# Patient Record
Sex: MALE | Race: WHITE | NOT HISPANIC OR LATINO | Employment: FULL TIME | URBAN - METROPOLITAN AREA
[De-identification: names, ages, dates, MRNs, and addresses within clinical notes are randomized per-mention and may not be internally consistent; named-entity substitution may affect disease eponyms.]

---

## 2023-07-10 ENCOUNTER — HOSPITAL ENCOUNTER (OUTPATIENT)
Facility: HOSPITAL | Age: 53
Setting detail: HOSPITAL OUTPATIENT SURGERY
End: 2023-07-10
Attending: ORTHOPAEDIC SURGERY | Admitting: ORTHOPAEDIC SURGERY
Payer: COMMERCIAL

## 2023-07-10 PROBLEM — S83.242A ACUTE MENISCAL TEAR, MEDIAL, LEFT, INITIAL ENCOUNTER: Status: ACTIVE | Noted: 2023-07-10

## 2023-07-25 ENCOUNTER — PRE-ADMISSION TESTING (OUTPATIENT)
Dept: PREADMISSION TESTING | Facility: HOSPITAL | Age: 53
End: 2023-07-25
Payer: COMMERCIAL

## 2023-07-25 VITALS
TEMPERATURE: 98.1 F | BODY MASS INDEX: 46.19 KG/M2 | SYSTOLIC BLOOD PRESSURE: 189 MMHG | DIASTOLIC BLOOD PRESSURE: 97 MMHG | HEIGHT: 67 IN | WEIGHT: 294.3 LBS | RESPIRATION RATE: 20 BRPM | HEART RATE: 83 BPM | OXYGEN SATURATION: 98 %

## 2023-07-25 LAB
ANION GAP SERPL CALCULATED.3IONS-SCNC: 10.1 MMOL/L (ref 5–15)
BUN SERPL-MCNC: 13 MG/DL (ref 6–20)
BUN/CREAT SERPL: 14.6 (ref 7–25)
CALCIUM SPEC-SCNC: 9.5 MG/DL (ref 8.6–10.5)
CHLORIDE SERPL-SCNC: 102 MMOL/L (ref 98–107)
CO2 SERPL-SCNC: 24.9 MMOL/L (ref 22–29)
CREAT SERPL-MCNC: 0.89 MG/DL (ref 0.76–1.27)
DEPRECATED RDW RBC AUTO: 42.9 FL (ref 37–54)
EGFRCR SERPLBLD CKD-EPI 2021: 102.5 ML/MIN/1.73
ERYTHROCYTE [DISTWIDTH] IN BLOOD BY AUTOMATED COUNT: 13.4 % (ref 12.3–15.4)
GLUCOSE SERPL-MCNC: 93 MG/DL (ref 65–99)
HCT VFR BLD AUTO: 45.2 % (ref 37.5–51)
HGB BLD-MCNC: 14.8 G/DL (ref 13–17.7)
MCH RBC QN AUTO: 28.8 PG (ref 26.6–33)
MCHC RBC AUTO-ENTMCNC: 32.7 G/DL (ref 31.5–35.7)
MCV RBC AUTO: 87.9 FL (ref 79–97)
PLATELET # BLD AUTO: 295 10*3/MM3 (ref 140–450)
PMV BLD AUTO: 9.5 FL (ref 6–12)
POTASSIUM SERPL-SCNC: 4.4 MMOL/L (ref 3.5–5.2)
QT INTERVAL: 347 MS
RBC # BLD AUTO: 5.14 10*6/MM3 (ref 4.14–5.8)
SODIUM SERPL-SCNC: 137 MMOL/L (ref 136–145)
WBC NRBC COR # BLD: 6.65 10*3/MM3 (ref 3.4–10.8)

## 2023-07-25 PROCEDURE — 93005 ELECTROCARDIOGRAM TRACING: CPT

## 2023-07-25 PROCEDURE — 80048 BASIC METABOLIC PNL TOTAL CA: CPT

## 2023-07-25 PROCEDURE — 85027 COMPLETE CBC AUTOMATED: CPT

## 2023-07-25 PROCEDURE — 93010 ELECTROCARDIOGRAM REPORT: CPT | Performed by: INTERNAL MEDICINE

## 2023-07-25 PROCEDURE — 36415 COLL VENOUS BLD VENIPUNCTURE: CPT

## 2023-07-25 RX ORDER — TADALAFIL 20 MG/1
20 TABLET ORAL DAILY PRN
COMMUNITY

## 2023-07-25 RX ORDER — IBUPROFEN 200 MG
400 TABLET ORAL DAILY
COMMUNITY

## 2023-07-25 RX ORDER — DEXTROAMPHETAMINE SACCHARATE, AMPHETAMINE ASPARTATE, DEXTROAMPHETAMINE SULFATE AND AMPHETAMINE SULFATE 5; 5; 5; 5 MG/1; MG/1; MG/1; MG/1
20 TABLET ORAL 2 TIMES DAILY
COMMUNITY

## 2023-07-25 RX ORDER — CYCLOBENZAPRINE HCL 5 MG
5 TABLET ORAL 2 TIMES DAILY PRN
COMMUNITY

## 2023-07-25 NOTE — DISCHARGE INSTRUCTIONS
Take the following medications the morning of surgery:    NONE    ARRIVE TO OUTPATIENT SURGERY CENTER 8-10-23:  HOSPITAL WILL CALL YOU WITH ARRIVAL TIME      If you are on prescription narcotic pain medication to control your pain you may also take that medication the morning of surgery.    General Instructions:  Do not eat solid food after midnight the night before surgery.  You may drink clear liquids day of surgery but must stop at least one hour before your hospital arrival time.  It is beneficial for you to have a clear drink that contains carbohydrates the day of surgery.  We suggest a 12 to 20 ounce bottle of Gatorade or Powerade for non-diabetic patients or a 12 to 20 ounce bottle of G2 or Powerade Zero for diabetic patients. (Pediatric patients, are not advised to drink a 12 to 20 ounce carbohydrate drink)    Clear liquids are liquids you can see through.  Nothing red in color.     Plain water                               Sports drinks  Sodas                                   Gelatin (Jell-O)  Fruit juices without pulp such as white grape juice and apple juice  Popsicles that contain no fruit or yogurt  Tea or coffee (no cream or milk added)  Gatorade / Powerade  G2 / Powerade Zero    Infants may have breast milk up to four hours before surgery.  Infants drinking formula may drink formula up to six hours before surgery.   Patients who avoid smoking, chewing tobacco and alcohol for 4 weeks prior to surgery have a reduced risk of post-operative complications.  Quit smoking as many days before surgery as you can.  Do not smoke, use chewing tobacco or drink alcohol the day of surgery.   If applicable bring your C-PAP/ BI-PAP machine in with you to preop day of surgery.  Bring any papers given to you in the doctor’s office.  Wear clean comfortable clothes.  Do not wear contact lenses, false eyelashes or make-up.  Bring a case for your glasses.   Bring crutches or walker if applicable.  Remove all piercings.   Leave jewelry and any other valuables at home.  Hair extensions with metal clips must be removed prior to surgery.  The Pre-Admission Testing nurse will instruct you to bring medications if unable to obtain an accurate list in Pre-Admission Testing.        Preventing a Surgical Site Infection:  For 2 to 3 days before surgery, avoid shaving with a razor because the razor can irritate skin and make it easier to develop an infection.    Any areas of open skin can increase the risk of a post-operative wound infection by allowing bacteria to enter and travel throughout the body.  Notify your surgeon if you have any skin wounds / rashes even if it is not near the expected surgical site.  The area will need assessed to determine if surgery should be delayed until it is healed.  The night prior to surgery shower using a fresh bar of anti-bacterial soap (such as Dial) and clean washcloth.  Sleep in a clean bed with clean clothing.  Do not allow pets to sleep with you.  Shower on the morning of surgery using a fresh bar of anti-bacterial soap (such as Dial) and clean washcloth.  Dry with a clean towel and dress in clean clothing.  Ask your surgeon if you will be receiving antibiotics prior to surgery.  Make sure you, your family, and all healthcare providers clean their hands with soap and water or an alcohol based hand  before caring for you or your wound.    Day of surgery:  Your arrival time is approximately two hours before your scheduled surgery time.  Upon arrival, a Pre-op nurse and Anesthesiologist will review your health history, obtain vital signs, and answer questions you may have.  The only belongings needed at this time will be a list of your home medications and if applicable your C-PAP/BI-PAP machine.  A Pre-op nurse will start an IV and you may receive medication in preparation for surgery, including something to help you relax.     Please be aware that surgery does come with discomfort.  We want to  make every effort to control your discomfort so please discuss any uncontrolled symptoms with your nurse.   Your doctor will most likely have prescribed pain medications.      If you are going home after surgery you will receive individualized written care instructions before being discharged.  A responsible adult must drive you to and from the hospital on the day of your surgery and stay with you for 24 hours.  Discharge prescriptions can be filled by the hospital pharmacy during regular pharmacy hours.  If you are having surgery late in the day/evening your prescription may be e-prescribed to your pharmacy.  Please verify your pharmacy hours or chose a 24 hour pharmacy to avoid not having access to your prescription because your pharmacy has closed for the day.    If you are staying overnight following surgery, you will be transported to your hospital room following the recovery period.  [facility] has all private rooms.    If you have any questions please call Pre-Admission Testing at [phone].  Deductibles and co-payments are collected on the day of service. Please be prepared to pay the required co-pay, deductible or deposit on the day of service as defined by your plan.    Call your surgeon immediately if you experience any of the following symptoms:  Sore Throat  Shortness of Breath or difficulty breathing  Cough  Chills  Body soreness or muscle pain  Headache  Fever  New loss of taste or smell  Do not arrive for your surgery ill.  Your procedure will need to be rescheduled to another time.  You will need to call your physician before the day of surgery to avoid any unnecessary exposure to hospital staff as well as other patients.

## 2023-08-07 DIAGNOSIS — S83.242A ACUTE MENISCAL TEAR, MEDIAL, LEFT, INITIAL ENCOUNTER: Primary | ICD-10-CM

## 2023-08-10 ENCOUNTER — HOSPITAL ENCOUNTER (OUTPATIENT)
Dept: PHYSICAL THERAPY | Facility: HOSPITAL | Age: 53
Setting detail: HOSPITAL OUTPATIENT SURGERY
Discharge: HOME | End: 2023-08-10
Payer: COMMERCIAL

## 2023-08-10 DIAGNOSIS — S83.242D ACUTE MEDIAL MENISCUS TEAR OF LEFT KNEE, SUBSEQUENT ENCOUNTER: ICD-10-CM

## 2023-08-10 DIAGNOSIS — M25.562 LEFT MEDIAL KNEE PAIN: Primary | ICD-10-CM

## 2023-08-10 PROCEDURE — 97110 THERAPEUTIC EXERCISES: CPT

## 2023-08-10 PROCEDURE — 97161 PT EVAL LOW COMPLEX 20 MIN: CPT

## 2023-08-10 NOTE — THERAPY EVALUATION
"  Outpatient Physical Therapy Ortho Initial Evaluation  [facility]     Patient Name: Abhijeet Bolton  : 1970  MRN: [id number]  Today's Date: 8/10/2023      Visit Date: 08/10/2023    Patient Active Problem List   Diagnosis    Acute meniscal tear, medial, left, initial encounter        Past Medical History:   Diagnosis Date    Sleep apnea     DOES NOT WEAR CPAP    Tear meniscus knee     LEFT KNEE:  PAIN, LIMITED MOBILITY        Past Surgical History:   Procedure Laterality Date    COLONOSCOPY         Visit Dx:     ICD-10-CM ICD-9-CM   1. Left medial knee pain  M25.562 719.46   2. Acute medial meniscus tear of left knee, subsequent encounter  S83.242D V58.89     836.0          Patient History       Row Name 08/10/23 0700             History    Chief Complaint Pain  -LOUISE      Type of Pain Knee pain  -LOUISE      Date Current Problem(s) Began --  1 year ago  -LOUISE      Brief Description of Current Complaint Abhijeet Bolton is a 53 y.o. male who presents to physical therapy today with primary compliant of L knee pain that began about 1 year. Patient without true ANDREW. He does recall an incident where he stood from a seated position and immediately felt \"something funny\" in his knee.  He has continued to have problems ever since.  Current pain is described as moderate, constant and aching. He reports associated clicking and popping. Previous L knee injection that helped for approximately one month but has since returned to baseline. He is scheduled for additional L knee injection directly after today's session. X-ray/MRI revealed moderate OA and radial tear of the posterior horn medial meniscus. He was originally scheduled for left knee arthroscopy and partial meniscectomy including possible chondroplasty for today but was informed insurance denial yesterday and needs formal PT prior to surgical intervention. Abhijeet Bolton reports difficulty/increased pain with standing from a chair after sitting " for a period of time, lateral movements, walking longer distances, sleep disturbances, and navigating stairs. Pain relieving factors include stretch, heat, and NSAIDS. PMH includes inguinal hernia.  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 X-ray;MRI  -LOUISE      Results of Clinical Tests see epic  -LOUISE         Pain     Pain Location Knee  -LOUISE      Pain at Present 1  -LOUISE      Pain at Best 1  -LOUISE      Pain at Worst 6  -LOUISE      Pain Description Aching;Dull;Sharp  -LOUISE      Is your sleep disturbed? Yes  -LOUISE         Fall Risk Assessment    Any falls in the past year: No  -LOUISE         Services    Prior Rehab/Home Health Experiences No  -LOUISE      Are you currently receiving Home Health services No  -LOUISE      Do you plan to receive Home Health services in the near future No  -LOUISE         Daily Activities    Primary Language English  -LOUISE      Pt Participated in POC and Goals Yes  -LOUISE         Safety    Are you being hurt, hit, or frightened by anyone at home or in your life? No  -LOUISE      Are you being neglected by a caregiver No  -LOUISE      Have you had any of the following issues with N/A  -LOUISE                User Key  (r) = Recorded By, (t) = Taken By, (c) = Cosigned By      Initials Name Provider Type    Liliana Marr, PT Physical Therapist                     PT Ortho       Row Name 08/10/23 0700       Special Tests/Palpation    Special Tests/Palpation Knee  -LOUISE       Knee Palpation    Knee Palpation? Yes  -LOUISE    Medial Joint Line Left:;Tender  -LOUISE       Patellar Accessory Motions    Patellar Accessory Motions Tested? Yes  -LOUISE    Superior glide Left:;WNL  -LOUISE    Inferior glide Left:;WNL  -LOUISE    Medial glide Left:;Hypomobile;Left pain   -LOUISE    Lateral glide Left:;Hypomobile;Left pain  -LOUISE       Knee Special Tests    Anterior drawer (ACL lesion) Negative  -LOUISE    Posterior drawer (PCL lesion) Negative  -LOUISE    Valgus stress (MCL lesion) Negative  reports medial knee pain without laxity  -LOUISE    Varus stress (LCL lesion) Negative  -LOUISE    Thessaly test (meniscal lesion) Left:;Positive  -LOUISE    Ashley's test (meniscal lesion) Left:;Positive  -LOUISE    Bounce home test (meniscal lesion) Left:;Positive  -LOUISE       General ROM    RT Lower Ext Rt Knee Extension/Flexion  -LOUISE    LT Lower Ext Lt Knee Extension/Flexion  -LOUISE       Right Lower Ext    Rt Knee Extension/Flexion AROM lacking 1-115  -LOUISE       Left Lower Ext    Lt Knee Extension/Flexion AROM lacking 4-105  -LOUISE       MMT (Manual Muscle Testing)    Rt Lower Ext Rt Hip Flexion;Rt Hip Extension;Rt Hip ABduction;Rt Knee Extension;Rt Knee Flexion;Rt Ankle Dorsiflexion  -LOUISE    Lt Lower Ext Lt Hip Flexion;Lt Hip Extension;Lt Hip ABduction;Lt Knee Extension;Lt Knee Flexion;Lt Ankle Dorsiflexion  -LOUISE       MMT Right Lower Ext    Rt Hip Flexion MMT, Gross Movement (5/5) normal  -LOUISE    Rt Hip Extension MMT, Gross Movement (4+/5) good plus  -LOUISE    Rt Hip ABduction MMT, Gross Movement (4+/5) good plus  -LOUISE    Rt Knee Extension MMT, Gross Movement (5/5) normal  -LOUISE    Rt Knee Flexion MMT, Gross Movement (5/5) normal  -LOUISE    Rt Ankle Dorsiflexion MMT, Gross Movement (5/5) normal  -LOUISE       MMT Left Lower Ext    Lt Hip Flexion MMT, Gross Movement (4+/5) good plus  -LOUISE    Lt Hip Extension MMT, Gross Movement (4+/5) good plus  -LOUISE    Lt Hip ABduction MMT, Gross Movement (4/5) good  -LOUISE    Lt Knee Extension MMT, Gross Movement (4+/5) good plus  -LOUISE    Lt Knee Flexion MMT, Gross Movement (4+/5) good plus  -LOUISE    Lt Ankle Dorsiflexion MMT, Gross Movement (5/5) normal  -LOUISE    Lt Lower Extremity Comments  hip abd/ext proximal to the knee  -LOUISE       Flexibility    Flexibility Tested? Lower Extremity  -LOUISE       Lower Extremity  Flexibility    Hamstrings Bilateral:;Moderately limited  -LOUISE    Gastrocnemius Bilateral:;Moderately limited  -LOUISE       Balance Skills Training    SLS < 5 seconds on L  -LOUISE       Gait/Stairs (Locomotion)    Comment, (Gait/Stairs) antalgic gait, limited L sided heel strike, dec L knee mobility during swing  -LOUISE              User Key  (r) = Recorded By, (t) = Taken By, (c) = Cosigned By      Initials Name Provider Type    Liliana Marr, PT Physical Therapist                                Therapy Education  Education Details: Educated on anatomy/pathology, evaluation findings, therapy expectations, POC and HEP, Access Code [email or web address]  Given: HEP, Symptoms/condition management, Pain management, Posture/body mechanics  Program: New  How Provided: Verbal, Demonstration, Written  Provided to: Patient  Level of Understanding: Verbalized, Demonstrated  37804 - PT Self Care/Mgmt Minutes: 5      PT OP Goals       Row Name 08/10/23 0700          PT Short Term Goals    STG Date to Achieve 09/09/23  -LOUISE     STG 1 Pt will be independent with initial HEP to improve strength/ROM and decrease pain.  -LOUISE     STG 1 Progress New  -     STG 2 Pt will report subjective pain of 3/10 while participating in walking program (1-3 miles - 3-5x/week) improve walking tolerance in attempt to return to PLOF.  -     STG 2 Progress New  -LOUISE (r) BS (t) LOUISE (c)        Long Term Goals    LTG Date to Achieve 10/09/23  -LOUISE     LTG 1 Pt will be independent with advance HEP to improve strength/ROM and decrease pain.  -LOUISE     LTG 1 Progress New  -LOUISE     LTG 2 Patient will verbalized understanding regarding post operative expectations regarding healing time frames and functional ability following expected medial meniscus repair surgery.  -LOUISE     LTG 2 Progress New  -LOUISE     LTG 3 Pt will improve L knee AROM from lacking 4-105 degrees to lacking 2-110 degrees to improve ability to navigate stairs.  -LOUISE     LTG 3 Progress New  -LOUISE     LTG 4 Pt will  improve L SLS to >/= 20 seconds to improve activity tolerance when ambulating over uneven terrain and when hiking.  -LOUISE     LTG 4 Progress New  -LOUISE (r) BS (t) LOUISE (c)     LTG 5 Pt will improve KOS score to 65% to show improved quality of life.  -LOUISE     LTG 5 Progress New  -LOUISE        Time Calculation    PT Goal Re-Cert Due Date 11/08/23  -LOUISE               User Key  (r) = Recorded By, (t) = Taken By, (c) = Cosigned By      Initials Name Provider Type    Liliana Marr, PT Physical Therapist    BS Rojas Mcfadden, PT Student PT Student                     PT Assessment/Plan       Row Name 08/10/23 0700          PT Assessment    Functional Limitations Impaired gait;Limitation in home management;Limitations in community activities;Limitations in functional capacity and performance;Performance in leisure activities;Performance in work activities  -     Impairments Balance;Gait;Impaired flexibility;Joint mobility;Joint integrity;Muscle strength;Pain;Poor body mechanics;Range of motion  -     Assessment Comments Abhijeet Bolton is a 53 y.o. male referred to physical therapy for L knee pain. X-ray/MRI revealed moderate OA and radial tear of the posterior horn medial meniscus. He was originally scheduled for left knee arthroscopy and partial meniscectomy including possible chondroplasty for today but was informed insurance denial yesterday and needs formal PT prior to surgical intervention. He presents with a stable clinical presentation, along with co-morbidities of inguinal hernia that may impact his progress in the plan of care. Pt presents today with impaired gait mechanics, dec SLS time, mild strength deficits, dec L knee AROM, TTP over medial aspect of L knee and impaired hamstring/gastroc flexibility. Special test + for medial meniscal involvement. His signs and symptoms are consistent with referring diagnosis. The previous impairments limit his ability to rise from a chair without pain, navigate  stairs without discomfort, sleep without disturbance and participate in walking program at [facility]. Patients KOS outcome measure score indicates 50% ability. Pt will benefit from skilled PT to address the previous impairments and return to [facility].  -LOUISE     Please refer to paper survey for additional self-reported information Yes  -LOUISE     Rehab Potential Good  -LOUISE     Patient/caregiver participated in establishment of treatment plan and goals Yes  -LOUISE     Patient would benefit from skilled therapy intervention Yes  -LOUISE        PT Plan    PT Frequency 2x/week;1x/week  -LOUISE     Predicted Duration of Therapy Intervention (PT) 8-10 visits  -LOUISE     Planned CPT's? PT EVAL LOW COMPLEXITY: 95320;PT RE-EVAL: 19014;PT THER PROC EA 15 MIN: 19886;PT THER ACT EA 15 MIN: 10023;PT MANUAL THERAPY EA 15 MIN: 95713;PT NEUROMUSC RE-EDUCATION EA 15 MIN: 09719;PT GAIT TRAINING EA 15 MIN: 59158;PT SELF CARE/HOME MGMT/TRAIN EA 15: 38848;PT HOT OR COLD PACK TREAT MCARE;PT ELECTRICAL STIM UNATTEND:   -LOUISE     PT Plan Comments response to evalution, stretch at stairs, consider SL clam shells, STS, sagittal lunge onto foam, mini squats onto foam, HR, HS curls, small step up, lateral stepping (patient plans to go hiking in 3 weeks)- pt plans to leave for [place] trip in [place]  -JP               User Key  (r) = Recorded By, (t) = Taken By, (c) = Cosigned By      Initials Name Provider Type    Liliana Marr, PT Physical Therapist                       OP Exercises       Row Name 08/10/23 0700             Subjective Comments    Subjective Comments eval  -LOUISE         Total Minutes    35133 - PT Therapeutic Exercise Minutes 10  -LOUISE         Exercise 1    Exercise Name 1 bike  -LOUISE      Additional Comments next visit  -LOUISE         Exercise 2    Exercise Name 2 seated HS stretch  -LOUISE      Cueing 2 Verbal;Demo  -LOUISE      Reps 2 3  -LOUISE      Time 2 20s  -LOUISE         Exercise 3    Exercise Name 3 runners gastroc stretch  -LOUISE      Cueing 3 Verbal;Demo   -LOUISE      Reps 3 3  -LOUISE      Time 3 20s  -LOUISE         Exercise 4    Exercise Name 4 supine QS  -LOUISE      Cueing 4 Verbal;Demo  -LOUISE      Sets 4 1  -LOUISE      Reps 4 15  -LOUISE      Time 4 5s  -LOUISE         Exercise 5    Exercise Name 5 supine SLR  -LOUISE      Cueing 5 Verbal;Tactile  -LOUISE      Sets 5 1  -LOUISE      Reps 5 10  -LOUISE         Exercise 6    Exercise Name 6 glute bridge  -LOUISE      Cueing 6 Verbal  -LOUISE      Sets 6 1  -LOUISE      Reps 6 10  -LOUISE         Exercise 7    Exercise Name 7 seated LAQ  -LOUISE      Cueing 7 Verbal;Demo  -LOUISE      Sets 7 1  -LOUISE      Reps 7 10  -LOUISE                User Key  (r) = Recorded By, (t) = Taken By, (c) = Cosigned By      Initials Name Provider Type    Liliana Marr PT Physical Therapist                                  Outcome Measure Options: Knee Outcome Score- ADL  Knee Outcome Survey Activities of Daily Living Scale  Symptoms: Pain: The symptom affects my activity severely  Symptoms: Stiffness: The symptom affects my activity moderately  Symptoms: Swelling: The symptom affects my activity moderately  Symptoms: Giving way, buckling, or shifting of the knee: I have the symptom, but it does not affect my activity  Symptoms: Weakness: I have the symptom, but it does not affect my activity  Symptoms: Limping: The symptom affects my activity severely  Functional Limitations with ADL's: Walk: Activity is somewhat difficult  Functional Limitations with ADL's: Go up stairs: Activity is fairly difficult  Functional Limitations with ADL's: Go down stairs: Activity is fairly difficult  Functional Limitations with ADL's: Stand: Activity is minimally difficult  Functional Limitations with ADL's: Kneel on front of your knee: Activity is very difficult  Functional Limitations with ADL's: Squat: Activity is very difficult  Functional Limitations with ADL's: Sit with your knee bent: Activity is minimally difficult  Functional Limitations with ADL's: Rise from a chair: Activity is minimally  difficult  Knee Outcome Summary ADL's Score: 50 %      Time Calculation:     Start Time: 0709  Stop Time: 0740  Time Calculation (min): 31 min  Timed Charges  71298 - PT Therapeutic Exercise Minutes: 10  45204 - PT Self Care/Mgmt Minutes: 5  Untimed Charges  PT Eval/Re-eval Minutes: 16  Total Minutes  Timed Charges Total Minutes: 15  Untimed Charges Total Minutes: 16   Total Minutes: 31     Therapy Charges for Today       Code Description Service Date Service Provider Modifiers Qty    68541415415 HC PT THER PROC EA 15 MIN 8/10/2023 Liliana Ponce, PT GP 1    [id number] HC PT EVAL LOW COMPLEXITY 1 8/10/2023 Liliana Ponce, PT GP 1            PT G-Codes  Outcome Measure Options: Knee Outcome Score- ADL         Liliana Ponce, PT  8/10/2023

## 2023-08-17 ENCOUNTER — HOSPITAL ENCOUNTER (OUTPATIENT)
Dept: PHYSICAL THERAPY | Facility: HOSPITAL | Age: 53
Setting detail: THERAPIES SERIES
Discharge: HOME | End: 2023-08-17
Payer: COMMERCIAL

## 2023-08-17 DIAGNOSIS — S83.242D ACUTE MEDIAL MENISCUS TEAR OF LEFT KNEE, SUBSEQUENT ENCOUNTER: ICD-10-CM

## 2023-08-17 DIAGNOSIS — M25.562 LEFT MEDIAL KNEE PAIN: Primary | ICD-10-CM

## 2023-08-17 PROCEDURE — 97110 THERAPEUTIC EXERCISES: CPT

## 2023-08-17 NOTE — THERAPY TREATMENT NOTE
Outpatient Physical Therapy Ortho Treatment Note  [facility]     Patient Name: Abhijeet Bolton  : 1970  MRN: [id number]  Today's Date: 2023      Visit Date: 2023    Visit Dx:    ICD-10-CM ICD-9-CM   1. Left medial knee pain  M25.562 719.46   2. Acute medial meniscus tear of left knee, subsequent encounter  S83.242D V58.89     836.0       Patient Active Problem List   Diagnosis    Acute meniscal tear, medial, left, initial encounter        Past Medical History:   Diagnosis Date    Sleep apnea     DOES NOT WEAR CPAP    Tear meniscus knee     LEFT KNEE:  PAIN, LIMITED MOBILITY        Past Surgical History:   Procedure Laterality Date    COLONOSCOPY                          PT Assessment/Plan       Row Name 23 0900          PT Assessment    Assessment Comments Berny returns for his first f/u since initial evaluation, denies changes since first visit. Intermitent compliance with HEP d/t busy work life. Initiated several additional stretches and LE strengthening exercises today to further build and support surrounding knee joint musculature. He continues to be very motivated to improve, had thorough discussion on realistic completion of HEP. Removed several exercises to highlight 3 to work on improving adherance. He leaves for [place] for the week, will be walking and hiking more than normal, assess tolerance to increased work this date and increased activity over the next week  -MO        PT Plan    PT Plan Comments Assess tolerance to initial exercises. STS, lateral step ups, increase HS curl weight, monster walks  -MO               User Key  (r) = Recorded By, (t) = Taken By, (c) = Cosigned By      Initials Name Provider Type    Gayle Bradford, PT Physical Therapist                       OP Exercises       Row Name 23 1011 23 0700          Subjective Comments    Subjective Comments -- No significant changes since eval. Has noticed some relief  -MO        Total Minutes     05015 - PT Therapeutic Exercise Minutes 45  -MO --        Exercise 1    Exercise Name 1 -- bike  -MO     Time 1 -- 5 mins  -MO     Additional Comments -- lvl 5  -MO        Exercise 2    Exercise Name 2 -- seated HS stretch  -MO     Cueing 2 -- Verbal;Demo  -MO     Reps 2 -- 3  -MO     Time 2 -- 30s  -MO        Exercise 3    Exercise Name 3 -- Stair gastroc stretch  -MO     Cueing 3 -- Verbal;Demo  -MO     Reps 3 -- 3 BL  -MO     Time 3 -- 30s  -MO        Exercise 4    Exercise Name 4 -- --  -MO     Cueing 4 -- --  -MO     Sets 4 -- --  -MO     Reps 4 -- --  -MO     Time 4 -- --  -MO        Exercise 5    Exercise Name 5 -- --  -MO     Cueing 5 -- --  -MO     Sets 5 -- --  -MO     Reps 5 -- --  -MO        Exercise 6    Exercise Name 6 -- --  -MO     Cueing 6 -- --  -MO     Sets 6 -- --  -MO     Reps 6 -- --  -MO        Exercise 7    Exercise Name 7 -- --  -MO     Cueing 7 -- --  -MO     Sets 7 -- --  -MO     Reps 7 -- --  -MO        Exercise 8    Exercise Name 8 -- HR  -MO     Cueing 8 -- Verbal  -MO     Sets 8 -- 2  -MO     Reps 8 -- 10  -MO     Additional Comments -- Cueing for slow controlled, 3s up/down  -MO        Exercise 9    Exercise Name 9 -- HS curls  -MO     Cueing 9 -- Verbal;Tactile  -MO     Sets 9 -- 2 BL  -MO     Reps 9 -- 10  -MO     Additional Comments -- 2#. Cueing for upright posture  -MO        Exercise 10    Exercise Name 10 -- lateral walking  -MO     Cueing 10 -- Verbal;Demo  -MO     Reps 10 -- 5 laps  -MO     Additional Comments -- RTB at [place]  -MO        Exercise 11    Exercise Name 11 -- step up/ step down  -MO     Cueing 11 -- Verbal;Demo  -MO     Sets 11 -- 2 BL up, 1 BL down  -MO     Reps 11 -- 10  -MO     Additional Comments -- 6in step  -MO               User Key  (r) = Recorded By, (t) = Taken By, (c) = Cosigned By      Initials Name Provider Type    MO Gayle Ivey, PT Physical Therapist                                  PT OP Goals       Row Name 08/17/23 0800          PT Short  Term Goals    STG Date to Achieve 09/09/23  -MO     STG 1 Pt will be independent with initial HEP to improve strength/ROM and decrease pain.  -MO     STG 1 Progress Ongoing  -MO     STG 2 Pt will report subjective pain of 3/10 while participating in walking program (1-3 miles - 3-5x/week) improve walking tolerance in attempt to return to PLOF.  -MO     STG 2 Progress Ongoing  -MO        Long Term Goals    LTG Date to Achieve 10/09/23  -MO     LTG 1 Pt will be independent with advance HEP to improve strength/ROM and decrease pain.  -MO     LTG 1 Progress Ongoing  -MO     LTG 2 Patient will verbalized understanding regarding post operative expectations regarding healing time frames and functional ability following expected medial meniscus repair surgery.  -MO     LTG 2 Progress Ongoing  -MO     LTG 3 Pt will improve L knee AROM from lacking 4-105 degrees to lacking 2-110 degrees to improve ability to navigate stairs.  -MO     LTG 3 Progress Ongoing  -MO     LTG 4 Pt will improve L SLS to >/= 20 seconds to improve activity tolerance when ambulating over uneven terrain and when hiking.  -MO     LTG 4 Progress Ongoing  -MO     LTG 5 Pt will improve KOS score to 65% to show improved quality of life.  -MO     LTG 5 Progress Ongoing  -MO               User Key  (r) = Recorded By, (t) = Taken By, (c) = Cosigned By      Initials Name Provider Type    Gayle Bardford, PT Physical Therapist                    Therapy Education  Education Details: Reviewed knee anatomy and indications of each stretch and strengthening exercise given. Encouraged rest days while in [place], strength training 3-4x' per week. Additionally dicussed ice for pain relief and inflammation.  Given: HEP, Symptoms/condition management, Pain management  Program: New, Reinforced  How Provided: Verbal, Demonstration, Written  Provided to: Patient  Level of Understanding: Verbalized, Demonstrated              Time Calculation:   Start Time: 0730  Stop Time:  0815  Time Calculation (min): 45 min  Timed Charges  98215 - PT Therapeutic Exercise Minutes: 45  Total Minutes  Timed Charges Total Minutes: 45   Total Minutes: 45  Therapy Charges for Today       Code Description Service Date Service Provider Modifiers Qty    49970050546 HC PT THER PROC EA 15 MIN 8/17/2023 Gayle Ivey, PT GP 3                      Gayle Ivey, PT  8/17/2023

## 2023-08-24 ENCOUNTER — HOSPITAL ENCOUNTER (OUTPATIENT)
Dept: PHYSICAL THERAPY | Facility: HOSPITAL | Age: 53
Setting detail: THERAPIES SERIES
Discharge: HOME | End: 2023-08-24
Payer: COMMERCIAL

## 2023-08-24 DIAGNOSIS — M25.562 LEFT MEDIAL KNEE PAIN: Primary | ICD-10-CM

## 2023-08-24 DIAGNOSIS — S83.242D ACUTE MEDIAL MENISCUS TEAR OF LEFT KNEE, SUBSEQUENT ENCOUNTER: ICD-10-CM

## 2023-08-24 PROCEDURE — 97110 THERAPEUTIC EXERCISES: CPT

## 2023-08-24 NOTE — THERAPY TREATMENT NOTE
Outpatient Physical Therapy Ortho Treatment Note  [facility]     Patient Name: Abhijeet Bolton  : 1970  MRN: [id number]  Today's Date: 2023      Visit Date: 2023    Visit Dx:    ICD-10-CM ICD-9-CM   1. Left medial knee pain  M25.562 719.46   2. Acute medial meniscus tear of left knee, subsequent encounter  S83.242D V58.89     836.0       Patient Active Problem List   Diagnosis    Acute meniscal tear, medial, left, initial encounter        Past Medical History:   Diagnosis Date    Sleep apnea     DOES NOT WEAR CPAP    Tear meniscus knee     LEFT KNEE:  PAIN, LIMITED MOBILITY        Past Surgical History:   Procedure Laterality Date    COLONOSCOPY                          PT Assessment/Plan       Row Name 23 07          PT Assessment    Assessment Comments Mr. Bolton returns to PT after going on trip to [place] over the weekend and reports mild lingering discomfort along medial aspect of L knee. He reports standing approximately 3 hours at a concert leading to pain and then walking 3 miles each day while on his trip. He was compliant with his stretches and feels they help. Reviewed newly added exercises last session with addition of step downs, monster walks, and lateral step ups with good tolerance. Discussed at length benefits of walking program coupled with strength training in multiple planes of motion. HEP not updated per patient request and will plan to add standing strengthening to HEP next session. Mr. Bolton remains a candidate for skilled PT.  -LOUISE        PT Plan    PT Plan Comments print HEP, review walking program with exercise routine, consider forward lunge  -LOUISE               User Key  (r) = Recorded By, (t) = Taken By, (c) = Cosigned By      Initials Name Provider Type    Liliana Marr, PT Physical Therapist                       OP Exercises       Row Name 23 0700             Subjective Comments    Subjective Comments I was up standing for 3  hours at a concert and had a lot of pain. I then walked 3 miles several times over the weekend. So I am a little sore  -LOUISE         Subjective Pain    Able to rate subjective pain? yes  -LOUISE      Pre-Treatment Pain Level 2  -LOUISE         Total Minutes    42024 - PT Therapeutic Exercise Minutes 41  -LOUISE         Exercise 1    Exercise Name 1 bike  -LOUISE      Time 1 5 mins  -LOUISE         Exercise 2    Exercise Name 2 standing HS stretch  -LOUISE      Cueing 2 Verbal;Demo  -LOUISE      Reps 2 3  -LOUISE      Time 2 30s  -LOUISE         Exercise 3    Exercise Name 3 Stair gastroc stretch  -LOUISE      Cueing 3 Verbal;Demo  -LOUISE      Reps 3 3 BL  -LOUISE      Time 3 30s  -LOUISE         Exercise 8    Exercise Name 8 HR  -LOUISE      Cueing 8 Verbal  -LOUISE      Sets 8 2  -LOUISE      Reps 8 10  -LOUISE      Time 8 standing on foam  -LOUISE         Exercise 9    Exercise Name 9 HS curls  -LOUISE      Cueing 9 Verbal;Tactile  -LOUISE      Sets 9 2 BL  -LOUISE      Reps 9 10  -LOUISE      Time 9 3#  -LOUISE         Exercise 10    Exercise Name 10 lateral walking  -LOUISE      Cueing 10 Verbal;Demo  -LOUISE      Reps 10 5 laps  -LOUISE      Additional Comments RTB at [place]  -LOUISE         Exercise 11    Exercise Name 11 step up  -LOUISE      Cueing 11 Verbal;Demo  -LOUISE      Sets 11 2  -LOUISE      Reps 11 10e  -LOUISE      Additional Comments fwd/lat  -LOUISE         Exercise 12    Exercise Name 12 step downs  -LOUISE      Cueing 12 Verbal;Demo  -LOUISE      Reps 12 15  -LOUISE      Time 12 4in  -LOUISE         Exercise 13    Exercise Name 13 monster walks  -LOUISE      Cueing 13 Verbal;Demo  -LOUISE      Reps 13 5 laps  -LOUISE      Additional Comments RTB at [place]  -LOUISE                User Key  (r) = Recorded By, (t) = Taken By, (c) = Cosigned By      Initials Name Provider Type    Liliana Marr, PT Physical Therapist                                  PT OP Goals       Row Name 08/24/23 0700          PT Short Term Goals    STG Date to Achieve 09/09/23  -LOUISE     STG 1 Pt will be independent with initial HEP to improve strength/ROM and decrease  pain.  -     STG 1 Progress Ongoing  -LOUISE     STG 2 Pt will report subjective pain of 3/10 while participating in walking program (1-3 miles - 3-5x/week) improve walking tolerance in attempt to return to PLOF.  -     STG 2 Progress Ongoing  -LOUISE        Long Term Goals    LTG Date to Achieve 10/09/23  -     LTG 1 Pt will be independent with advance HEP to improve strength/ROM and decrease pain.  -     LTG 1 Progress Ongoing  -LOUISE     LTG 2 Patient will verbalized understanding regarding post operative expectations regarding healing time frames and functional ability following expected medial meniscus repair surgery.  -     LTG 2 Progress Ongoing  -LOUISE     LTG 3 Pt will improve L knee AROM from lacking 4-105 degrees to lacking 2-110 degrees to improve ability to navigate stairs.  -     LTG 3 Progress Ongoing  -LOUISE     LTG 4 Pt will improve L SLS to >/= 20 seconds to improve activity tolerance when ambulating over uneven terrain and when hiking.  -     LTG 4 Progress Ongoing  -LOUISE     LTG 5 Pt will improve KOS score to 65% to show improved quality of life.  -     LTG 5 Progress Ongoing  -               User Key  (r) = Recorded By, (t) = Taken By, (c) = Cosigned By      Initials Name Provider Type    Liliana Marr, PT Physical Therapist                    Therapy Education  Education Details: importance of walking program coupled with HEP/resistance training  Given: HEP, Symptoms/condition management, Pain management, Posture/body mechanics  Program: Reinforced  How Provided: Verbal, Demonstration  Provided to: Patient  Level of Understanding: Verbalized, Demonstrated              Time Calculation:   Start Time: 0700  Stop Time: 0741  Time Calculation (min): 41 min  Timed Charges  40383 - PT Therapeutic Exercise Minutes: 41  Total Minutes  Timed Charges Total Minutes: 41   Total Minutes: 41  Therapy Charges for Today       Code Description Service Date Service Provider Modifiers Qty     [id number]  PT THER PROC EA 15 MIN 8/24/2023 Liliana Ponce, PT GP 3                      Liliana Ponce, PT  8/24/2023

## 2023-08-29 ENCOUNTER — HOSPITAL ENCOUNTER (OUTPATIENT)
Dept: PHYSICAL THERAPY | Facility: HOSPITAL | Age: 53
Setting detail: THERAPIES SERIES
Discharge: HOME | End: 2023-08-29
Payer: COMMERCIAL

## 2023-08-29 DIAGNOSIS — S83.242D ACUTE MEDIAL MENISCUS TEAR OF LEFT KNEE, SUBSEQUENT ENCOUNTER: ICD-10-CM

## 2023-08-29 DIAGNOSIS — M25.562 LEFT MEDIAL KNEE PAIN: Primary | ICD-10-CM

## 2023-08-29 PROCEDURE — 97110 THERAPEUTIC EXERCISES: CPT

## 2023-08-29 NOTE — THERAPY TREATMENT NOTE
Outpatient Physical Therapy Ortho Treatment Note  [facility]     Patient Name: Abhijeet Bolton  : 1970  MRN: [id number]  Today's Date: 2023      Visit Date: 2023    Visit Dx:    ICD-10-CM ICD-9-CM   1. Left medial knee pain  M25.562 719.46   2. Acute medial meniscus tear of left knee, subsequent encounter  S83.242D V58.89     836.0       Patient Active Problem List   Diagnosis    Acute meniscal tear, medial, left, initial encounter        Past Medical History:   Diagnosis Date    Sleep apnea     DOES NOT WEAR CPAP    Tear meniscus knee     LEFT KNEE:  PAIN, LIMITED MOBILITY        Past Surgical History:   Procedure Laterality Date    COLONOSCOPY                          PT Assessment/Plan       Row Name 23 0800          PT Assessment    Assessment Comments Berny returns to therapy with continued improvements overall in knee pain. He reports shorted HEP continues to work best for his schedule and feels he is getting results from current stretches and exercises. Today, added several additional hip strengthening with good tolerance. Modified HEP to now focus on 3 different exercises: step ups, lateral step ups, and monster walks. Additionally discussed pts reports of toe numbness, encourgaed thorough calf stretching prior to and following long walks as well as checking for shoe lace tightness and assess for changes in symptoms. Next session, assess tolerance/adherance to updated HEP, maintain for 2-3 sessions and then modify exercises again. He continues to be a good candidate for skilled PT to address remaining deficits.  -MO        PT Plan    PT Plan Comments Review modified HEP. consider 3 way lunge  -MO               User Key  (r) = Recorded By, (t) = Taken By, (c) = Cosigned By      Initials Name Provider Type    Gayle Bradford, PT Physical Therapist                       OP Exercises       Row Name 23 0840 23 0700          Subjective Comments    Subjective  Comments -- No significant changes since last session. Went on a 3 mile walk, some achyness following. Am noticing sometimes after long walks, 2nd toe goes numb. Overall, I feel like I'm walking better and able to trust my knee more.  -MO        Total Minutes    46957 - PT Therapeutic Exercise Minutes 39  -MO --        Exercise 1    Exercise Name 1 -- bike  -MO     Time 1 -- 5 mins  -MO        Exercise 2    Exercise Name 2 -- standing HS stretch  -MO     Cueing 2 -- Verbal;Demo  -MO     Reps 2 -- 3  -MO     Time 2 -- 30s  -MO        Exercise 3    Exercise Name 3 -- Stair gastroc stretch  -MO     Cueing 3 -- Verbal;Demo  -MO     Reps 3 -- 3 BL  -MO     Time 3 -- 30s  -MO        Exercise 7    Exercise Name 7 -- Standing marching  -MO     Cueing 7 -- Verbal;Demo  -MO     Sets 7 -- 1 BL  -MO     Reps 7 -- 20  -MO     Additional Comments -- cueing for slow, controlled  -MO        Exercise 8    Exercise Name 8 -- HR  -MO     Cueing 8 -- Verbal  -MO     Sets 8 -- 2  -MO     Reps 8 -- 10  -MO     Time 8 -- standing on foam  -MO     Additional Comments -- Standing on edge of foam for increased motion  -MO        Exercise 9    Exercise Name 9 -- HS curls  -MO     Cueing 9 -- Verbal;Tactile  -MO     Sets 9 -- 2 BL  -MO     Reps 9 -- 10  -MO     Time 9 -- 3#  -MO        Exercise 10    Exercise Name 10 -- --  -MO     Cueing 10 -- --  -MO     Reps 10 -- --  -MO        Exercise 11    Exercise Name 11 -- step up  -MO     Cueing 11 -- Verbal;Demo  -MO     Sets 11 -- 2  -MO     Reps 11 -- 10e  -MO        Exercise 12    Exercise Name 12 -- step downs  -MO     Cueing 12 -- Verbal;Demo  -MO     Reps 12 -- 15  -MO     Time 12 -- 4in  -MO        Exercise 13    Exercise Name 13 -- monster walks  -MO     Cueing 13 -- Verbal;Demo  -MO     Reps 13 -- 5 laps  -MO        Exercise 14    Exercise Name 14 -- Lateral step ups/down  -MO     Cueing 14 -- Verbal;Demo  -MO     Sets 14 -- 1  -MO               User Key  (r) = Recorded By, (t) = Taken By,  (c) = Cosigned By      Initials Name Provider Type    Gayle Bradford PT Physical Therapist                                  PT OP Goals       Row Name 08/29/23 0800          PT Short Term Goals    STG Date to Achieve 09/09/23  -MO     STG 1 Pt will be independent with initial HEP to improve strength/ROM and decrease pain.  -MO     STG 1 Progress Ongoing  -MO     STG 2 Pt will report subjective pain of 3/10 while participating in walking program (1-3 miles - 3-5x/week) improve walking tolerance in attempt to return to PLOF.  -MO     STG 2 Progress Ongoing  -MO        Long Term Goals    LTG Date to Achieve 10/09/23  -MO     LTG 1 Pt will be independent with advance HEP to improve strength/ROM and decrease pain.  -MO     LTG 1 Progress Ongoing  -MO     LTG 2 Patient will verbalized understanding regarding post operative expectations regarding healing time frames and functional ability following expected medial meniscus repair surgery.  -MO     LTG 2 Progress Ongoing  -MO     LTG 3 Pt will improve L knee AROM from lacking 4-105 degrees to lacking 2-110 degrees to improve ability to navigate stairs.  -MO     LTG 3 Progress Ongoing  -MO     LTG 4 Pt will improve L SLS to >/= 20 seconds to improve activity tolerance when ambulating over uneven terrain and when hiking.  -MO     LTG 4 Progress Ongoing  -MO     LTG 5 Pt will improve KOS score to 65% to show improved quality of life.  -MO     LTG 5 Progress Ongoing  -MO               User Key  (r) = Recorded By, (t) = Taken By, (c) = Cosigned By      Initials Name Provider Type    Gayle Bradford PT Physical Therapist                    Therapy Education  Education Details: Discussed stretching calves pre and post exercising to address 2nd toe numbness and also monitor how tight shoe laces are tied. Emphasized stretching above all else currently if unable to do exercises d/t work/life schedule.  Given: HEP, Symptoms/condition management  Program: New, Reinforced  How  Provided: Verbal, Demonstration, Written  Provided to: Patient              Time Calculation:   Start Time: 0753  Stop Time: 0832  Time Calculation (min): 39 min  Timed Charges  82440 - PT Therapeutic Exercise Minutes: 39  Total Minutes  Timed Charges Total Minutes: 39   Total Minutes: 39  Therapy Charges for Today       Code Description Service Date Service Provider Modifiers Qty    61614871398 HC PT THER PROC EA 15 MIN 8/29/2023 Gayle Ivey, PT GP 3                      Gayle Ivey, PT  8/29/2023

## 2023-08-31 ENCOUNTER — HOSPITAL ENCOUNTER (OUTPATIENT)
Dept: PHYSICAL THERAPY | Facility: HOSPITAL | Age: 53
Setting detail: THERAPIES SERIES
Discharge: HOME | End: 2023-08-31
Payer: COMMERCIAL

## 2023-08-31 DIAGNOSIS — M25.562 LEFT MEDIAL KNEE PAIN: Primary | ICD-10-CM

## 2023-08-31 DIAGNOSIS — S83.242D ACUTE MEDIAL MENISCUS TEAR OF LEFT KNEE, SUBSEQUENT ENCOUNTER: ICD-10-CM

## 2023-08-31 PROCEDURE — 97110 THERAPEUTIC EXERCISES: CPT

## 2023-08-31 NOTE — THERAPY TREATMENT NOTE
Outpatient Physical Therapy Ortho Treatment Note  [facility]     Patient Name: Abhijeet Bolton  : 1970  MRN: [id number]  Today's Date: 2023      Visit Date: 2023    Visit Dx:    ICD-10-CM ICD-9-CM   1. Left medial knee pain  M25.562 719.46   2. Acute medial meniscus tear of left knee, subsequent encounter  S83.242D V58.89     836.0       Patient Active Problem List   Diagnosis    Acute meniscal tear, medial, left, initial encounter        Past Medical History:   Diagnosis Date    Sleep apnea     DOES NOT WEAR CPAP    Tear meniscus knee     LEFT KNEE:  PAIN, LIMITED MOBILITY        Past Surgical History:   Procedure Laterality Date    COLONOSCOPY                          PT Assessment/Plan       Row Name 23 0900          PT Assessment    Assessment Comments Pt arrived 15 min after originally scheduled therapy time. Reviewed modified HEP to assess appropriate form and pt tolerated it well. Pt had decreased pain with his L knee throughout activity today. Added a three way lunge on foam, which is what time allowed. 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  He will continue to benefit from skilled PT at s time.  -DR        PT Plan    PT Plan Comments assess how pt felt after last session, step up with , inc resistance to exs  -DR               User Key  (r) = Recorded By, (t) = Taken By, (c) = Cosigned By      Initials Name Provider Type    Joel Nieto, PT Physical Therapist                       OP Exercises       Row Name 23 0800             Subjective Comments    Subjective Comments Left knee is actually feeling pretty good. I feel my gait is better and i trust it a little more with walking.  -DR         Total Minutes    04031 - PT Therapeutic Exercise Minutes 30  -DR         Exercise 1    Exercise Name 1 bike  -DR      Time 1 5 mins  -DR         Exercise 2    Exercise Name 2  standing HS stretch  -DR      Cueing 2 Verbal;Demo  -DR      Reps 2 3  -DR      Time 2 30s  -DR         Exercise 3    Exercise Name 3 Stair gastroc stretch  -DR      Cueing 3 Verbal;Demo  -DR      Reps 3 3 BL  -DR      Time 3 30s  -DR         Exercise 10    Exercise Name 10 lateral walking  -DR      Cueing 10 Verbal;Demo  -DR      Reps 10 5 laps  -DR      Time 10 RTB  mid calf  -DR         Exercise 11    Exercise Name 11 step up  -DR      Cueing 11 Verbal;Demo  -DR      Sets 11 2  -DR      Reps 11 10e  -DR      Time 11 use stairs  -DR         Exercise 12    Exercise Name 12 step downs  -DR      Cueing 12 Verbal;Demo  -DR      Reps 12 15  -DR      Time 12 4in  -DR         Exercise 13    Exercise Name 13 monster walks  -DR      Cueing 13 Verbal;Demo  -DR      Reps 13 5 laps  -DR      Time 13 RTB  -DR         Exercise 14    Exercise Name 14 Lateral step taps  -DR      Cueing 14 Verbal;Demo  -DR      Sets 14 2  -DR      Reps 14 10e  -DR      Time 14 use stairs  -DR         Exercise 15    Exercise Name 15 3way lunge on foam  -DR      Cueing 15 Verbal;Demo  -DR      Sets 15 1  -DR      Reps 15 10e  -DR                User Key  (r) = Recorded By, (t) = Taken By, (c) = Cosigned By      Initials Name Provider Type    Joel Nieto, PT Physical Therapist                                  PT OP Goals       Row Name 08/31/23 0900          PT Short Term Goals    STG Date to Achieve 09/09/23  -DR     STG 1 Pt will be independent with initial HEP to improve strength/ROM and decrease pain.  -     STG 1 Progress Met  -     STG 2 Pt will report subjective pain of 3/10 while participating in walking program (1-3 miles - 3-5x/week) improve walking tolerance in attempt to return to PLOF.  -     STG 2 Progress Ongoing  -DR        Long Term Goals    LTG Date to Achieve 10/09/23  -DR     LTG 1 Pt will be independent with advance HEP to improve strength/ROM and decrease pain.  -     LTG 1 Progress Ongoing  -DR     LTG 2 Patient  will verbalized understanding regarding post operative expectations regarding healing time frames and functional ability following expected medial meniscus repair surgery.  -DR SYEDG 2 Progress Ongoing  -     LTG 3 Pt will improve L knee AROM from lacking 4-105 degrees to lacking 2-110 degrees to improve ability to navigate stairs.  -DR SYEDG 3 Progress Ongoing  -     LTG 4 Pt will improve L SLS to >/= 20 seconds to improve activity tolerance when ambulating over uneven terrain and when hiking.  -DR OLMOS 4 Progress Ongoing  -     LTG 5 Pt will improve KOS score to 65% to show improved quality of life.  -DR OLMOS 5 Progress Ongoing  -               User Key  (r) = Recorded By, (t) = Taken By, (c) = Cosigned By      Initials Name Provider Type    Joel Nieto, PT Physical Therapist                    Therapy Education  Education Details: under desk LE bike for work desk vs steps, updating HEP in a few sessions  Given: HEP, Mobility training  Program: Reinforced, Progressed  How Provided: Verbal  Provided to: Patient  Level of Understanding: Teach back education performed, Verbalized              Time Calculation:   Start Time: 0845  Stop Time: 0915  Time Calculation (min): 30 min  Timed Charges  14044 - PT Therapeutic Exercise Minutes: 30  Total Minutes  Timed Charges Total Minutes: 30   Total Minutes: 30  Therapy Charges for Today       Code Description Service Date Service Provider Modifiers Qty    [id number]  PT THER PROC EA 15 MIN 8/31/2023 Joel Gomes, PT GP 2                      Joel Gomes, PT  8/31/2023

## 2023-09-05 ENCOUNTER — HOSPITAL ENCOUNTER (OUTPATIENT)
Dept: PHYSICAL THERAPY | Facility: HOSPITAL | Age: 53
Setting detail: THERAPIES SERIES
Discharge: HOME | End: 2023-09-05
Payer: COMMERCIAL

## 2023-09-05 DIAGNOSIS — M25.562 LEFT MEDIAL KNEE PAIN: Primary | ICD-10-CM

## 2023-09-05 DIAGNOSIS — S83.242D ACUTE MEDIAL MENISCUS TEAR OF LEFT KNEE, SUBSEQUENT ENCOUNTER: ICD-10-CM

## 2023-09-05 PROCEDURE — 97110 THERAPEUTIC EXERCISES: CPT

## 2023-09-05 NOTE — THERAPY TREATMENT NOTE
Outpatient Physical Therapy Ortho Treatment Note  [facility]     Patient Name: Abhijeet Bolton  : 1970  MRN: [id number]  Today's Date: 2023      Visit Date: 2023    Visit Dx:    ICD-10-CM ICD-9-CM   1. Left medial knee pain  M25.562 719.46   2. Acute medial meniscus tear of left knee, subsequent encounter  S83.242D V58.89     836.0       Patient Active Problem List   Diagnosis    Acute meniscal tear, medial, left, initial encounter        Past Medical History:   Diagnosis Date    Sleep apnea     DOES NOT WEAR CPAP    Tear meniscus knee     LEFT KNEE:  PAIN, LIMITED MOBILITY        Past Surgical History:   Procedure Laterality Date    COLONOSCOPY                          PT Assessment/Plan       Row Name 23 1100          PT Assessment    Assessment Comments Mr. Bolton arrives to PT reporting mild flare up in symptoms that began yesterday after he had participated in yardwork related tasks. He was standing on decline surface and bent forward to  object causing excessive load through anterior aspect of L knee. Patient also reports significantly increasing his overall activity through the last week with swimming/walking frequently. Discussed appropriate exercise frequency to allow for muscle recovery. Continued with current exercise routine and added SLS and transverse lunge with good tolerance. Mr. Bolton remains a candidate for skilled PT.  -LOUISE        PT Plan    PT Plan Comments progress note, consider knee  to step up, leg press?  -LOUISE               User Key  (r) = Recorded By, (t) = Taken By, (c) = Cosigned By      Initials Name Provider Type    Liliana Marr, PT Physical Therapist                       OP Exercises       Row Name 23 1000             Subjective Comments    Subjective Comments I had some extra pain when I was bending down to pick something up off a downward incline, I also really picked up my overall activity level. I have been  swimming and walking longer distances  -LOUISE         Total Minutes    85869 - PT Therapeutic Exercise Minutes 45  -LOUISE         Exercise 1    Exercise Name 1 bike  -LOUISE      Time 1 5 mins  -LOUISE         Exercise 2    Exercise Name 2 standing HS stretch  -LOUISE      Cueing 2 Verbal;Demo  -LOUISE      Reps 2 3  -LOUISE      Time 2 30s  -LOUISE         Exercise 3    Exercise Name 3 Stair gastroc stretch  -LOUISE      Cueing 3 Verbal;Demo  -LOUISE      Reps 3 3 BL  -LOUISE      Time 3 30s  -LOUISE         Exercise 8    Exercise Name 8 HR  -LOUISE      Cueing 8 Verbal  -LOUISE      Sets 8 2  -LOUISE      Reps 8 10  -LOUISE      Time 8 off edge of step  -LOUISE         Exercise 10    Exercise Name 10 lateral walking  -LOUISE      Cueing 10 Verbal;Demo  -LOUISE      Reps 10 5 laps  -LOUISE      Time 10 RTB  mid calf  -LOUISE         Exercise 11    Exercise Name 11 step up  -LOUISE      Cueing 11 Verbal;Demo  -LOUISE      Sets 11 2  -LOUISE      Reps 11 10e  -LOUISE      Time 11 use stairs  -LOIUSE      Additional Comments 8in  -LOUISE         Exercise 12    Exercise Name 12 step downs  -LOUISE      Cueing 12 Verbal;Demo  -LOUISE      Reps 12 15  -LOUISE      Time 12 4in  -LOUISE      Additional Comments emphasis on bottom back  -LOUISE         Exercise 13    Exercise Name 13 monster walks  -LOUISE      Cueing 13 Verbal;Demo  -LOUISE      Reps 13 5 laps  -LOUISE      Time 13 RTB  -LOUISE         Exercise 14    Exercise Name 14 Lateral step ups  -LOUISE      Cueing 14 Verbal;Demo  -LOUISE      Sets 14 2  -LOUISE      Reps 14 10e  -LOUISE      Time 14 8in  -LOUISE         Exercise 15    Exercise Name 15 3way lunge on foam  -LOUISE      Cueing 15 Verbal;Demo  -LOUISE      Sets 15 1  -LOUISE      Reps 15 10e  -LOUISE      Additional Comments SFT  -LOUISE         Exercise 16    Exercise Name 16 SLS  -LOUISE      Cueing 16 Verbal;Demo  -LOUISE      Reps 16 3  -LOUISE      Time 16 20s  -LOUISE      Additional Comments L only  -LOUISE                User Key  (r) = Recorded By, (t) = Taken By, (c) = Cosigned By      Initials Name Provider Type    Liliana Marr, PT Physical Therapist                                   PT OP Goals       Row Name 09/05/23 1000          PT Short Term Goals    STG Date to Achieve 09/09/23  -     STG 1 Pt will be independent with initial HEP to improve strength/ROM and decrease pain.  -     STG 1 Progress Met  -     STG 2 Pt will report subjective pain of 3/10 while participating in walking program (1-3 miles - 3-5x/week) improve walking tolerance in attempt to return to PLOF.  -     STG 2 Progress Ongoing  -LOUISE        Long Term Goals    LTG Date to Achieve 10/09/23  -     LTG 1 Pt will be independent with advance HEP to improve strength/ROM and decrease pain.  -     LTG 1 Progress Ongoing  -     LTG 2 Patient will verbalized understanding regarding post operative expectations regarding healing time frames and functional ability following expected medial meniscus repair surgery.  -     LTG 2 Progress Ongoing  -     LTG 3 Pt will improve L knee AROM from lacking 4-105 degrees to lacking 2-110 degrees to improve ability to navigate stairs.  -     LTG 3 Progress Ongoing  -     LTG 4 Pt will improve L SLS to >/= 20 seconds to improve activity tolerance when ambulating over uneven terrain and when hiking.  -     LTG 4 Progress Ongoing  -     LTG 5 Pt will improve KOS score to 65% to show improved quality of life.  -     LTG 5 Progress Ongoing  -               User Key  (r) = Recorded By, (t) = Taken By, (c) = Cosigned By      Initials Name Provider Type    Liliana Marr, DEDE Physical Therapist                                   Time Calculation:   Start Time: 1050  Stop Time: 1135  Time Calculation (min): 45 min  Timed Charges  26271 - PT Therapeutic Exercise Minutes: 45  Total Minutes  Timed Charges Total Minutes: 45   Total Minutes: 45  Therapy Charges for Today       Code Description Service Date Service Provider Modifiers Qty    [id number] HC PT THER PROC EA 15 MIN 9/5/2023 Liliana Ponce, DEDE GP 3                      Liliana Ponce,  PT  9/5/2023

## 2023-09-12 ENCOUNTER — HOSPITAL ENCOUNTER (OUTPATIENT)
Dept: PHYSICAL THERAPY | Facility: HOSPITAL | Age: 53
Setting detail: THERAPIES SERIES
Discharge: HOME | End: 2023-09-12
Payer: COMMERCIAL

## 2023-09-12 DIAGNOSIS — M25.562 LEFT MEDIAL KNEE PAIN: Primary | ICD-10-CM

## 2023-09-12 DIAGNOSIS — S83.242D ACUTE MEDIAL MENISCUS TEAR OF LEFT KNEE, SUBSEQUENT ENCOUNTER: ICD-10-CM

## 2023-09-12 PROCEDURE — 97530 THERAPEUTIC ACTIVITIES: CPT

## 2023-09-12 PROCEDURE — 97110 THERAPEUTIC EXERCISES: CPT

## 2023-09-12 NOTE — THERAPY PROGRESS REPORT/RE-CERT
Outpatient Physical Therapy Ortho Progress Note  [facility]     Patient Name: Abhijeet Bolton  : 1970  MRN: [id number]  Today's Date: 2023      Visit Date: 2023    Visit Dx:    ICD-10-CM ICD-9-CM   1. Left medial knee pain  M25.562 719.46   2. Acute medial meniscus tear of left knee, subsequent encounter  S83.242D V58.89     836.0       Patient Active Problem List   Diagnosis    Acute meniscal tear, medial, left, initial encounter        Past Medical History:   Diagnosis Date    Sleep apnea     DOES NOT WEAR CPAP    Tear meniscus knee     LEFT KNEE:  PAIN, LIMITED MOBILITY        Past Surgical History:   Procedure Laterality Date    COLONOSCOPY          PT Ortho       Row Name 23       Left Lower Ext    Lt Knee Extension/Flexion AROM lacking 1-111  -DR              User Key  (r) = Recorded By, (t) = Taken By, (c) = Cosigned By      Initials Name Provider Type    Joel Nieto, PT Physical Therapist                                 PT Assessment/Plan       Row Name 23          PT Assessment    Functional Limitations Impaired gait;Limitation in home management;Limitations in community activities;Limitations in functional capacity and performance;Performance in leisure activities;Performance in work activities  -DR     Impairments Balance;Gait;Impaired flexibility;Joint mobility;Joint integrity;Muscle strength;Pain;Poor body mechanics;Range of motion  -DR     Assessment Comments Abhijeet Bolton has been seen for 7 physical therapy sessions for L knee pain. Treatment has included therapeutic exercise, manual therapy, and therapeutic activity. Progress to physical therapy goals is good as pt. Has met 1/2 STGs, and 3/5 LTGs. He reports independence with HEP, relief of symptoms with performing exercises, less pain with walking program where he can get pain down to a 1/10 once warmed up, improved self-reported function shown with KOS-ADL, improved ROM with  knee flexion and extension (lacking 1-111). Pt still has pain with walking up hills, but overall feels he has improved. He will benefit from continued skilled physical therapy to address remaining impairments and functional limitations.  -DR     Please refer to paper survey for additional self-reported information No  -DR     Rehab Potential Good  -DR     Patient/caregiver participated in establishment of treatment plan and goals Yes  -DR     Patient would benefit from skilled therapy intervention Yes  -DR        PT Plan    PT Frequency 1x/week  -     Predicted Duration of Therapy Intervention (PT) 5-7 sessions  -     Planned CPT's? PT RE-EVAL: 17697;PT THER PROC EA 15 MIN: 16783;PT THER ACT EA 15 MIN: 59611;PT MANUAL THERAPY EA 15 MIN: 62541;PT NEUROMUSC RE-EDUCATION EA 15 MIN: 43435;PT GAIT TRAINING EA 15 MIN: 62534;PT SELF CARE/HOME MGMT/TRAIN EA 15: 55041;PT HOT OR COLD PACK TREAT YARITZARE  -     PT Plan Comments appt with MD?, trampoline toss both legs, lateral knee drivers  -               User Key  (r) = Recorded By, (t) = Taken By, (c) = Cosigned By      Initials Name Provider Type    Joel Nieto, PT Physical Therapist                       OP Exercises       Row Name 09/12/23 0900             Subjective    Brief Description of Chief Complaint It still acts up sometimes, but i do feel my knee exercises have paid off. 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  -DR         Total Minutes    81013 - PT Therapeutic Exercise Minutes 35  -DR      84610 - PT Therapeutic Activity Minutes 10  -DR         Exercise 1    Exercise Name 1 bike  -DR      Time 1 5 mins  -DR         Exercise 2    Exercise Name 2 standing HS stretch  -DR      Cueing 2 Verbal;Demo  -DR      Reps 2 3  -DR      Time 2 20s  -DR         Exercise 3    Exercise Name 3 Stair gastroc stretch  -DR      Cueing 3 Verbal;Demo  -DR      Reps 3 3 BL  -DR      Time 3 20s  -DR         Exercise 4    Exercise Name 4 TA-  "time for filling outcome measures, objective measures, and review goals  -DR      Time 4 10 min  -DR         Exercise 8    Exercise Name 8 HR  -DR      Cueing 8 Verbal  -DR      Sets 8 2  -DR      Reps 8 15  -DR      Time 8 off foam pad  -DR         Exercise 10    Exercise Name 10 lateral walking  -DR      Cueing 10 Verbal;Demo  -DR      Reps 10 5 laps  -DR      Time 10 GTB mid calf  -DR         Exercise 11    Exercise Name 11 step up w/   -DR      Cueing 11 Verbal;Demo  -DR      Sets 11 2  -DR      Reps 11 10e  -DR      Time 11 8\" step  -DR         Exercise 12    Exercise Name 12 step downs  -DR      Cueing 12 Verbal;Demo  -DR      Reps 12 15  -DR      Time 12 4in  -DR         Exercise 13    Exercise Name 13 monster walks  -DR      Cueing 13 Verbal;Demo  -DR      Reps 13 5 laps  -DR      Time 13 GTB  -DR         Exercise 15    Exercise Name 15 3way lunge on foam  -DR      Cueing 15 Verbal;Demo  -DR      Sets 15 1  -DR      Reps 15 10e  -DR         Exercise 16    Exercise Name 16 --  -DR      Cueing 16 --  -DR      Reps 16 --  -DR      Time 16 --  -DR      Additional Comments --  -DR         Exercise 17    Exercise Name 17 leg press  -DR      Cueing 17 Verbal  -DR      Sets 17 2  -DR      Reps 17 10e  -DR      Additional Comments 60# SL, 120# DL  -DR                User Key  (r) = Recorded By, (t) = Taken By, (c) = Cosigned By      Initials Name Provider Type    Joel Nieto, PT Physical Therapist                                  PT OP Goals       Row Name 09/12/23 0900          PT Short Term Goals    STG Date to Achieve 09/09/23  -DR     STG 1 Pt will be independent with initial HEP to improve strength/ROM and decrease pain.  -     STG 1 Progress Met  -     STG 2 Pt will report subjective pain of 3/10 while participating in walking program (1-3 miles - 3-5x/week) improve walking tolerance in attempt to return to PLOF.  -     STG 2 Progress Ongoing  -     STG 2 Progress Comments \"somewhere b/t a " "2-3 dependent on flat or hilly\"  -        Long Term Goals    LTG Date to Achieve 10/09/23  -     LTG 1 Pt will be independent with advance HEP to improve strength/ROM and decrease pain.  -     LTG 1 Progress Ongoing  -     LTG 2 Patient will verbalized understanding regarding post operative expectations regarding healing time frames and functional ability following expected medial meniscus repair surgery.  -     LTG 2 Progress Met  -     LTG 3 Pt will improve L knee AROM from lacking 4-105 degrees to lacking 2-110 degrees to improve ability to navigate stairs.  -     LTG 3 Progress Met  -     LTG 3 Progress Comments lacking 1-111  -     LTG 4 Pt will improve L SLS to >/= 20 seconds to improve activity tolerance when ambulating over uneven terrain and when hiking.  -     LTG 4 Progress Met  -     LTG 4 Progress Comments >30s with no UE support 9/12  -     LTG 5 Pt will improve KOS score to 65% to show improved quality of life.  -     LTG 5 Progress Ongoing  -     LTG 5 Progress Comments 61% on 9/12  -               User Key  (r) = Recorded By, (t) = Taken By, (c) = Cosigned By      Initials Name Provider Type    Joel Nieto, PT Physical Therapist                    Therapy Education  Given: HEP, Mobility training  Program: Reinforced, Progressed  How Provided: Verbal  Provided to: Patient  Level of Understanding: Teach back education performed, Verbalized       Knee Outcome Survey Activities of Daily Living Scale  Symptoms: Pain: The symptom affects my activity slightly  Symptoms: Stiffness: The symptom affects my activity slightly  Symptoms: Swelling: I have the symptom, but it does not affect my activity  Symptoms: Giving way, buckling, or shifting of the knee: The symptom affects my activity slightly  Symptoms: Weakness: The symptom affects my activity moderately  Symptoms: Limping: The symptom affects my activity slightly  Functional Limitations with ADL's: Walk: Activity is " minimally difficult  Functional Limitations with ADL's: Go up stairs: Activity is somewhat difficult  Functional Limitations with ADL's: Go down stairs: Activity is somewhat difficult  Functional Limitations with ADL's: Stand: Activity is minimally difficult  Functional Limitations with ADL's: Kneel on front of your knee: Activity is fairly difficult  Functional Limitations with ADL's: Squat: Activity is somewhat difficult  Functional Limitations with ADL's: Sit with your knee bent: Activity is fairly difficult  Functional Limitations with ADL's: Rise from a chair: Activity is minimally difficult  Knee Outcome Summary ADL's Score: 61.43 %      Time Calculation:   Start Time: 0915  Stop Time: 1002  Time Calculation (min): 47 min  Timed Charges  12474 - PT Therapeutic Exercise Minutes: 35  83110 - PT Therapeutic Activity Minutes: 10  Total Minutes  Timed Charges Total Minutes: 45   Total Minutes: 45  Therapy Charges for Today       Code Description Service Date Service Provider Modifiers Qty    [id number]  PT THER PROC EA 15 MIN 9/12/2023 Joel Gomes, PT GP 2    [id number]  PT THERAPEUTIC ACT EA 15 MIN 9/12/2023 Joel Gomes, PT GP 1                      Joel Gomes PT  9/12/2023

## 2023-09-14 ENCOUNTER — HOSPITAL ENCOUNTER (OUTPATIENT)
Dept: PHYSICAL THERAPY | Facility: HOSPITAL | Age: 53
Setting detail: THERAPIES SERIES
Discharge: HOME | End: 2023-09-14
Payer: COMMERCIAL

## 2023-09-14 DIAGNOSIS — S83.242D ACUTE MEDIAL MENISCUS TEAR OF LEFT KNEE, SUBSEQUENT ENCOUNTER: Primary | ICD-10-CM

## 2023-09-14 DIAGNOSIS — M25.562 LEFT MEDIAL KNEE PAIN: ICD-10-CM

## 2023-09-14 PROCEDURE — 97110 THERAPEUTIC EXERCISES: CPT

## 2023-09-14 NOTE — THERAPY TREATMENT NOTE
Outpatient Physical Therapy Ortho Treatment Note  [facility]     Patient Name: Abhijeet Bolton  : 1970  MRN: [id number]  Today's Date: 2023      Visit Date: 2023    Visit Dx:    ICD-10-CM ICD-9-CM   1. Acute medial meniscus tear of left knee, subsequent encounter  S83.242D V58.89     836.0   2. Left medial knee pain  M25.562 719.46       Patient Active Problem List   Diagnosis    Acute meniscal tear, medial, left, initial encounter        Past Medical History:   Diagnosis Date    Sleep apnea     DOES NOT WEAR CPAP    Tear meniscus knee     LEFT KNEE:  PAIN, LIMITED MOBILITY        Past Surgical History:   Procedure Laterality Date    COLONOSCOPY                          PT Assessment/Plan       Row Name 23 0800          PT Assessment    Assessment Comments Mr. Bolton returns today with slight flare-up on R side of back, where he had previous injury to it. Discussed possibly due from increasing resistance/adding exerises to last visit. Provided HEP targeting back stretches to assist with discomfort, and discussed extensively to rest and use heat or ice on back to help with it. Likely an acute response to working muscles that havent been worked in a while. Able to tell pt was having discomfort during exs today due to inc rest breaks and trying to stretch it between sets. Due to pt showing up 7 min late and requesting to leave a few minutes earlier, able to get through a few exs and then educate on resting back. Discussed MD appt that he has next Tuesday and seeing what he says about PT due to pt making great progress with therapy. Did not update HEP at this visit due to flare-up of back muscles, but will re assess next visit. Pt will continue to benefit from skilled PT at this time.  -        PT Plan    PT Plan Comments how is the back? trampoline toss B LE, lateral knee drivers, maybe dec intensity next visit if still flared up?  -               User Key  (r) = Recorded By,  "(t) = Taken By, (c) = Cosigned By      Initials Name Provider Type    Joel Nieto, PT Physical Therapist                       OP Exercises       Row Name 09/14/23 0700             Subjective    Subjective Comments My knee has been fine, but my back has started bugging me again. That is from a prior injury.  -DR         Total Minutes    75461 - PT Therapeutic Exercise Minutes 36  -DR         Exercise 1    Exercise Name 1 nustep  -DR      Time 1 5 mins  -DR         Exercise 2    Exercise Name 2 standing HS stretch  -DR      Cueing 2 Verbal;Demo  -DR      Reps 2 3  -DR      Time 2 20s  -DR         Exercise 3    Exercise Name 3 Stair gastroc stretch  -DR      Cueing 3 Verbal;Demo  -DR      Reps 3 3 BL  -DR      Time 3 20s  -DR         Exercise 4    Exercise Name 4 knee flexion stretch  -DR      Cueing 4 Verbal;Demo  -DR      Reps 4 3  -DR      Time 4 20s  -DR         Exercise 8    Exercise Name 8 HR  -DR      Cueing 8 Verbal  -DR      Sets 8 2  -DR      Reps 8 15  -DR      Time 8 off foam pad  -DR         Exercise 10    Exercise Name 10 lateral walking  -DR      Cueing 10 Verbal;Demo  -DR      Reps 10 5 laps  -DR      Time 10 GTB mid calf  -DR         Exercise 11    Exercise Name 11 fwd and lateral step ups  -DR      Cueing 11 Verbal;Demo  -DR      Sets 11 1  -DR      Reps 11 10e  -DR      Time 11 8\" step  -DR         Exercise 13    Exercise Name 13 monster walks  -DR      Cueing 13 Verbal;Demo  -DR      Reps 13 5 laps  -DR      Time 13 GTB  -DR                User Key  (r) = Recorded By, (t) = Taken By, (c) = Cosigned By      Initials Name Provider Type    Joel Nieto, PT Physical Therapist                                  PT OP Goals       Row Name 09/14/23 0700          PT Short Term Goals    STG Date to Achieve 09/09/23  -DR     STG 1 Pt will be independent with initial HEP to improve strength/ROM and decrease pain.  -     STG 1 Progress Met  -DR     STG 2 Pt will report subjective pain of 3/10 " while participating in walking program (1-3 miles - 3-5x/week) improve walking tolerance in attempt to return to PLOF.  -     STG 2 Progress Ongoing  -        Long Term Goals    LTG Date to Achieve 10/09/23  -     LTG 1 Pt will be independent with advance HEP to improve strength/ROM and decrease pain.  -     LTG 1 Progress Ongoing  -     LTG 2 Patient will verbalized understanding regarding post operative expectations regarding healing time frames and functional ability following expected medial meniscus repair surgery.  -     LTG 2 Progress Met  -     LTG 3 Pt will improve L knee AROM from lacking 4-105 degrees to lacking 2-110 degrees to improve ability to navigate stairs.  -     LTG 3 Progress Met  -     LTG 4 Pt will improve L SLS to >/= 20 seconds to improve activity tolerance when ambulating over uneven terrain and when hiking.  -     LTG 4 Progress Met  -     LTG 5 Pt will improve KOS score to 65% to show improved quality of life.  -     LTG 5 Progress Ongoing  -               User Key  (r) = Recorded By, (t) = Taken By, (c) = Cosigned By      Initials Name Provider Type    Joel Nieto, PT Physical Therapist                    Therapy Education  Education Details: rest/heat for back soreness, discussing of DDN to back- not through insurance, possible referral for baack if still irritated next visit and after seeing MD, will update program next visit  Given: HEP, Symptoms/condition management, Pain management, Posture/body mechanics, Mobility training  Program: Reinforced, Progressed  How Provided: Verbal, Demonstration, Written  Provided to: Patient  Level of Understanding: Teach back education performed, Demonstrated, Verbalized              Time Calculation:   Start Time: 0752  Stop Time: 0828  Time Calculation (min): 36 min  Timed Charges  25129 - PT Therapeutic Exercise Minutes: 36  Total Minutes  Timed Charges Total Minutes: 36   Total Minutes: 36  Therapy Charges for  Today       Code Description Service Date Service Provider Modifiers Qty    24847559583 HC PT THER PROC EA 15 MIN 9/14/2023 Joel Gomes, PT GP 2                      Joel Gomes, PT  9/14/2023

## 2023-09-19 ENCOUNTER — APPOINTMENT (OUTPATIENT)
Dept: PHYSICAL THERAPY | Facility: HOSPITAL | Age: 53
End: 2023-09-19
Payer: COMMERCIAL

## 2023-09-21 ENCOUNTER — HOSPITAL ENCOUNTER (OUTPATIENT)
Dept: PHYSICAL THERAPY | Facility: HOSPITAL | Age: 53
Setting detail: THERAPIES SERIES
Discharge: HOME | End: 2023-09-21
Payer: COMMERCIAL

## 2023-09-21 DIAGNOSIS — S83.242D ACUTE MEDIAL MENISCUS TEAR OF LEFT KNEE, SUBSEQUENT ENCOUNTER: Primary | ICD-10-CM

## 2023-09-21 DIAGNOSIS — M25.562 LEFT MEDIAL KNEE PAIN: ICD-10-CM

## 2023-09-21 PROCEDURE — 97110 THERAPEUTIC EXERCISES: CPT

## 2023-09-21 NOTE — THERAPY TREATMENT NOTE
Outpatient Physical Therapy Ortho Treatment Note  [facility]     Patient Name: Abhijeet Bolton  : 1970  MRN: [id number]  Today's Date: 2023      Visit Date: 2023    Visit Dx:    ICD-10-CM ICD-9-CM   1. Acute medial meniscus tear of left knee, subsequent encounter  S83.242D V58.89     836.0   2. Left medial knee pain  M25.562 719.46       Patient Active Problem List   Diagnosis    Acute meniscal tear, medial, left, initial encounter        Past Medical History:   Diagnosis Date    Sleep apnea     DOES NOT WEAR CPAP    Tear meniscus knee     LEFT KNEE:  PAIN, LIMITED MOBILITY        Past Surgical History:   Procedure Laterality Date    COLONOSCOPY                          PT Assessment/Plan       Row Name 23 0900          PT Assessment    Assessment Comments Mr. Bolton reports improvement in LBP this date and did not require rest breaks to stretch throughout session. Due to patient 10 minute late arrival, focused on previously performed exercises with addition of SLS ball toss to rebounder, SL HR, goblet squats and resumed step downs with good tolerance. Mr. Bolton remains a candidate for skilled PT.  -LOUISE        PT Plan    PT Plan Comments did pt hear back from insurance, consider resuming lunges, consider RDL?  -LOUISE               User Key  (r) = Recorded By, (t) = Taken By, (c) = Cosigned By      Initials Name Provider Type    Liliana Marr, PT Physical Therapist                       OP Exercises       Row Name 23 0800             Subjective    Subjective Comments THings are going good. I spoke with MD and I need to contact insurance to learn more about requirements for surgery  -LOUISE         Subjective Pain    Subjective Pain Comment arrives 10 mins late  -LOUISE         Total Minutes    74176 - PT Therapeutic Exercise Minutes 30  -LOUISE         Exercise 1    Exercise Name 1 bike  -LOUISE      Time 1 5 mins  -LOUISE         Exercise 2    Exercise Name 2 standing HS stretch   "-LOUISE      Cueing 2 Verbal;Demo  -LOUISE      Reps 2 3  -LOUISE      Time 2 20s  -LOUISE         Exercise 3    Exercise Name 3 Stair gastroc stretch  -LOUISE      Cueing 3 Verbal;Demo  -LOUISE      Reps 3 3 BL  -LOUISE      Time 3 20s  -LOUISE         Exercise 4    Exercise Name 4 knee flexion stretch  -LOUISE      Cueing 4 Verbal;Demo  -LOUISE      Reps 4 3  -LOUISE      Time 4 20s  -LOUISE         Exercise 5    Exercise Name 5 SLS ball toss to rebounder  -LOUISE      Cueing 5 Verbal;Demo  -LOUISE      Sets 5 2  -LOUISE      Reps 5 15  -LOUISE      Time 5 L2  -LOUISE         Exercise 8    Exercise Name 8 SL HR  -LOUISE      Cueing 8 Verbal;Demo  -LOUISE      Sets 8 2  -LOUISE      Reps 8 10  -LOUISE         Exercise 9    Exercise Name 9 goblet squat to chair  -LOUISE      Cueing 9 Verbal;Demo  -LOUISE      Sets 9 2  -LOUISE      Reps 9 10  -LOUISE      Time 9 holding 5# DB  -LOUISE         Exercise 11    Exercise Name 11 step up w/   -LOUISE      Cueing 11 Verbal;Demo  -LOUISE      Sets 11 2  -LOUISE      Reps 11 10e  -LOUISE      Time 11 8\" step  -LOUISE         Exercise 12    Exercise Name 12 step downs  -LOUISE      Cueing 12 Verbal;Demo  -LOUISE      Reps 12 2x10  -LOUISE      Time 12 6in  -LOUISE         Exercise 17    Exercise Name 17 leg press  -LOUISE      Additional Comments resume next session  -LOUISE                User Key  (r) = Recorded By, (t) = Taken By, (c) = Cosigned By      Initials Name Provider Type    Liliana Marr, PT Physical Therapist                                  PT OP Goals       Row Name 09/21/23 0800          PT Short Term Goals    STG Date to Achieve 09/09/23  -LOUISE     STG 1 Pt will be independent with initial HEP to improve strength/ROM and decrease pain.  -LOUISE     STG 1 Progress Met  -LOUISE     STG 2 Pt will report subjective pain of 3/10 while participating in walking program (1-3 miles - 3-5x/week) improve walking tolerance in attempt to return to PLOF.  -LOUISE     STG 2 Progress Ongoing  -LUOISE        Long Term Goals    LTG Date to Achieve 10/09/23  -LOUISE     LTG 1 Pt will be independent with advance HEP to " improve strength/ROM and decrease pain.  -     LTG 1 Progress Ongoing  -     LTG 2 Patient will verbalized understanding regarding post operative expectations regarding healing time frames and functional ability following expected medial meniscus repair surgery.  -     LTG 2 Progress Met  -     LTG 3 Pt will improve L knee AROM from lacking 4-105 degrees to lacking 2-110 degrees to improve ability to navigate stairs.  -     LTG 3 Progress Met  -     LTG 4 Pt will improve L SLS to >/= 20 seconds to improve activity tolerance when ambulating over uneven terrain and when hiking.  -     LTG 4 Progress Met  -     LTG 5 Pt will improve KOS score to 65% to show improved quality of life.  -     LTG 5 Progress Ongoing  -               User Key  (r) = Recorded By, (t) = Taken By, (c) = Cosigned By      Initials Name Provider Type    Liliana Marr, PT Physical Therapist                                   Time Calculation:   Start Time: 0840  Stop Time: 0910  Time Calculation (min): 30 min  Timed Charges  92472 - PT Therapeutic Exercise Minutes: 30  Total Minutes  Timed Charges Total Minutes: 30   Total Minutes: 30  Therapy Charges for Today       Code Description Service Date Service Provider Modifiers Qty    [id number]  PT THER PROC EA 15 MIN 9/21/2023 Liliana Ponce, PT GP 2                      Liliana Ponce, PT  9/21/2023

## 2023-09-26 ENCOUNTER — APPOINTMENT (OUTPATIENT)
Dept: PHYSICAL THERAPY | Facility: HOSPITAL | Age: 53
End: 2023-09-26
Payer: COMMERCIAL

## 2023-09-28 ENCOUNTER — APPOINTMENT (OUTPATIENT)
Dept: PHYSICAL THERAPY | Facility: HOSPITAL | Age: 53
End: 2023-09-28
Payer: COMMERCIAL

## 2023-10-03 ENCOUNTER — HOSPITAL ENCOUNTER (OUTPATIENT)
Dept: PHYSICAL THERAPY | Facility: HOSPITAL | Age: 53
Setting detail: THERAPIES SERIES
Discharge: HOME | End: 2023-10-03
Payer: COMMERCIAL

## 2023-10-03 DIAGNOSIS — M25.562 LEFT MEDIAL KNEE PAIN: ICD-10-CM

## 2023-10-03 DIAGNOSIS — S83.242D ACUTE MEDIAL MENISCUS TEAR OF LEFT KNEE, SUBSEQUENT ENCOUNTER: Primary | ICD-10-CM

## 2023-10-03 PROCEDURE — 97110 THERAPEUTIC EXERCISES: CPT

## 2023-10-03 NOTE — THERAPY TREATMENT NOTE
Outpatient Physical Therapy Ortho Treatment Note  [facility]     Patient Name: Abhijeet Bolton  : 1970  MRN: [id number]  Today's Date: 10/3/2023      Visit Date: 10/03/2023    Visit Dx:    ICD-10-CM ICD-9-CM   1. Acute medial meniscus tear of left knee, subsequent encounter  S83.242D V58.89     836.0   2. Left medial knee pain  M25.562 719.46       Patient Active Problem List   Diagnosis    Acute meniscal tear, medial, left, initial encounter        Past Medical History:   Diagnosis Date    Sleep apnea     DOES NOT WEAR CPAP    Tear meniscus knee     LEFT KNEE:  PAIN, LIMITED MOBILITY        Past Surgical History:   Procedure Laterality Date    COLONOSCOPY                          PT Assessment/Plan       Row Name 10/03/23 0800          PT Assessment    Assessment Comments Pt arrives at 837 for his 830 appt. Reports f/u with Dr. Gandara on 10/11 which will hopefully clear confusion about continuing PT vs surgery. No pain reported today. Continued previous exercises; added RDL with hip hinge focus and resumed lunges at todays appt. Difficulty with cuing for proper form with RDL due to inability to brace core rather than low back. Will revisit next visit. Pt tolerates all exercises and progressions well and will continue to benefit from PT at this time.  -DR        PT Plan    PT Plan Comments how did f/u with Dr. Gandara go (10/11)? possible add STS with standing heel raise, walking lunges.  -               User Key  (r) = Recorded By, (t) = Taken By, (c) = Cosigned By      Initials Name Provider Type    Joel Nieto, PT Physical Therapist                       OP Exercises       Row Name 10/03/23 0800             Subjective    Subjective Comments No pain today. I have a follow up with doctor on 10/11.  -DR         Total Minutes    89038 - PT Therapeutic Exercise Minutes 38  -DR         Exercise 1    Exercise Name 1 TM  -DR      Time 1 5 mins; 2.0mph  -DR         Exercise 2    Exercise  "Name 2 standing HS stretch  -DR      Cueing 2 Verbal;Demo  -DR      Reps 2 3  -DR      Time 2 20s  -DR         Exercise 3    Exercise Name 3 Stair gastroc stretch  -DR      Cueing 3 Verbal;Demo  -DR      Reps 3 3 BL  -DR      Time 3 20s  -DR         Exercise 4    Exercise Name 4 knee flexion stretch  -DR      Cueing 4 Verbal;Demo  -DR      Reps 4 3  -DR      Time 4 20s  -DR         Exercise 5    Exercise Name 5 SLS ball toss to rebounder  -DR      Cueing 5 Verbal;Demo  -DR      Sets 5 2  -DR      Reps 5 15  -DR      Time 5 L2  -DR         Exercise 6    Exercise Name 6 RDL  -DR      Cueing 6 Verbal;Demo  -DR      Sets 6 1  -DR      Reps 6 10  -DR      Time 6 BW  -DR      Additional Comments hip hinge focus- pt had difficult time with hinging and wanted to use low back.  -DR         Exercise 7    Exercise Name 7 fwd and lateral lunge onto bosu  -DR      Cueing 7 Verbal;Demo  -DR      Sets 7 2  -DR      Reps 7 10e  -DR         Exercise 8    Exercise Name 8 SL HR  -DR      Cueing 8 Verbal;Demo  -DR      Sets 8 2  -DR      Reps 8 15  -DR         Exercise 9    Exercise Name 9 goblet squat to chair  -DR      Cueing 9 Verbal;Demo  -DR      Sets 9 2  -DR      Reps 9 10  -DR      Time 9 holding 5# DB  -DR         Exercise 10    Exercise Name 10 lateral walking  -DR      Cueing 10 Verbal;Demo  -DR      Reps 10 4 laps  -DR      Time 10 GTB mid calf  -DR         Exercise 11    Exercise Name 11 step up w/   -DR      Cueing 11 Verbal;Demo  -DR      Sets 11 2  -DR      Reps 11 10e  -DR      Time 11 8\" step  -DR         Exercise 13    Exercise Name 13 monster walks  -DR      Cueing 13 Verbal;Demo  -DR      Reps 13 4 laps  -DR      Time 13 GTB  -DR         Exercise 17    Exercise Name 17 leg press  -DR      Additional Comments resume next session  -DR                User Key  (r) = Recorded By, (t) = Taken By, (c) = Cosigned By      Initials Name Provider Type    Joel Nieto, PT Physical Therapist              "                     PT OP Goals       Row Name 10/03/23 0800          PT Short Term Goals    STG Date to Achieve 09/09/23  -     STG 1 Pt will be independent with initial HEP to improve strength/ROM and decrease pain.  -     STG 1 Progress Met  -     STG 2 Pt will report subjective pain of 3/10 while participating in walking program (1-3 miles - 3-5x/week) improve walking tolerance in attempt to return to PLOF.  -DR     STG 2 Progress Ongoing  -DR        Long Term Goals    LTG Date to Achieve 10/09/23  -DR     LTG 1 Pt will be independent with advance HEP to improve strength/ROM and decrease pain.  -     LTG 1 Progress Ongoing  -     LTG 2 Patient will verbalized understanding regarding post operative expectations regarding healing time frames and functional ability following expected medial meniscus repair surgery.  -     LTG 2 Progress Met  -     LTG 3 Pt will improve L knee AROM from lacking 4-105 degrees to lacking 2-110 degrees to improve ability to navigate stairs.  -     LTG 3 Progress Met  -     LTG 4 Pt will improve L SLS to >/= 20 seconds to improve activity tolerance when ambulating over uneven terrain and when hiking.  -     LTG 4 Progress Met  -     LTG 5 Pt will improve KOS score to 65% to show improved quality of life.  -     LTG 5 Progress Ongoing  -DR               User Key  (r) = Recorded By, (t) = Taken By, (c) = Cosigned By      Initials Name Provider Type    Joel Nieto, PT Physical Therapist                    Therapy Education  Given: HEP, Symptoms/condition management, Pain management, Posture/body mechanics, Mobility training  Program: Reinforced  How Provided: Verbal  Provided to: Patient  Level of Understanding: Teach back education performed, Verbalized              Time Calculation:   Start Time: 0837  Stop Time: 0915  Time Calculation (min): 38 min  Timed Charges  12209 - PT Therapeutic Exercise Minutes: 38  Total Minutes  Timed Charges Total Minutes:  38   Total Minutes: 38  Therapy Charges for Today       Code Description Service Date Service Provider Modifiers Qty    19978979425 HC PT THER PROC EA 15 MIN 10/3/2023 Joel Gomes, PT GP 3                      Joel Gomes, PT  10/3/2023

## 2023-10-05 ENCOUNTER — APPOINTMENT (OUTPATIENT)
Dept: PHYSICAL THERAPY | Facility: HOSPITAL | Age: 53
End: 2023-10-05
Payer: COMMERCIAL

## 2023-10-11 ENCOUNTER — OFFICE VISIT (OUTPATIENT)
Dept: ORTHOPEDIC SURGERY | Facility: CLINIC | Age: 53
End: 2023-10-11
Payer: COMMERCIAL

## 2023-10-11 VITALS — HEIGHT: 67 IN | WEIGHT: 288 LBS | TEMPERATURE: 98.1 F | BODY MASS INDEX: 45.2 KG/M2

## 2023-10-11 DIAGNOSIS — G89.29 CHRONIC PAIN OF LEFT KNEE: Primary | ICD-10-CM

## 2023-10-11 DIAGNOSIS — M25.562 CHRONIC PAIN OF LEFT KNEE: Primary | ICD-10-CM

## 2023-10-11 PROCEDURE — 99212 OFFICE O/P EST SF 10 MIN: CPT | Performed by: ORTHOPAEDIC SURGERY

## 2023-10-11 RX ORDER — MELOXICAM 15 MG/1
TABLET ORAL EVERY 24 HOURS
COMMUNITY
Start: 2023-08-24

## 2023-10-11 NOTE — PROGRESS NOTES
CC:  Follow up left knee pain    Mr. Bolton follows up for his left knee.  We had him scheduled for an arthroscopy but it was denied due to insurance.  They recommended physical therapy.  He did the therapy as instructed.  He feels like it has made a huge difference.  He says the knee is much better overall.  He estimates he is 90 to 95% of normal at this point.  He questions if he needs the surgery.    We had a long conversation.  We talked about the natural history of his problem and his options.  We talked about arthroscopy versus arthroplasty and the fact that he is likely headed towards an arthroplasty down the road.  I explained that there is certainly no hurry to consider surgical intervention.  If he is better, I recommend we have him continue the therapy and just see how it goes.  He was fully on board with that plan.  I will wait to hear from him going forward.    Ernst Gandara MD

## 2023-10-12 ENCOUNTER — HOSPITAL ENCOUNTER (OUTPATIENT)
Dept: PHYSICAL THERAPY | Facility: HOSPITAL | Age: 53
Setting detail: THERAPIES SERIES
Discharge: HOME | End: 2023-10-12
Payer: COMMERCIAL

## 2023-10-12 DIAGNOSIS — M25.562 LEFT MEDIAL KNEE PAIN: ICD-10-CM

## 2023-10-12 DIAGNOSIS — S83.242D ACUTE MEDIAL MENISCUS TEAR OF LEFT KNEE, SUBSEQUENT ENCOUNTER: Primary | ICD-10-CM

## 2023-10-12 PROCEDURE — 97530 THERAPEUTIC ACTIVITIES: CPT

## 2023-10-12 PROCEDURE — 97110 THERAPEUTIC EXERCISES: CPT

## 2023-10-12 NOTE — THERAPY DISCHARGE NOTE
Outpatient Physical Therapy Ortho Progress Note/Discharge Summary  [facility]     Patient Name: Abhijeet Bolton  : 1970  MRN: [id number]  Today's Date: 10/12/2023      Visit Date: 10/12/2023    Visit Dx:    ICD-10-CM ICD-9-CM   1. Acute medial meniscus tear of left knee, subsequent encounter  S83.242D V58.89     836.0   2. Left medial knee pain  M25.562 719.46       Patient Active Problem List   Diagnosis    Acute meniscal tear, medial, left, initial encounter        Past Medical History:   Diagnosis Date    Sleep apnea     DOES NOT WEAR CPAP    Tear meniscus knee     LEFT KNEE:  PAIN, LIMITED MOBILITY        Past Surgical History:   Procedure Laterality Date    COLONOSCOPY                          PT Assessment/Plan       Row Name 10/12/23 1000          PT Assessment    Assessment Comments Abhijeet Bolton was seen for 11 physical therapy sessions for L moderate OA and radial tear of the posterior horn medial meniscus. Treatment included therapeutic exercise, manual therapy, therapeutic activity, and patient education with home exercise program .  Progress to physical therapy goals was excellent. Pt met 2/2 STG and 4/5 LTG. At d/c, pt is no longer a surgical candidate, has seen great improvement with therapy sessions. Pain is intermittent and minimal now, he has resumed 2-3 mile daily walks without discomfort and is completing high level exercises in session. Additionally ROM has improved from 4-105 to >2-110 and has shown improved SLS time Time hs improved to >30s from <6s at eval. Spent session stretching, initiated several new hip mobility stretches for pt to add to HEP for a full rounded program. Time spent discussing advanced HEP and appropriate progressions/modifications to make based on response following discharge and optimal frequency/duration to complete HEP over next several weeks-months. Patient verbalized understanding. He was discharged to an independent HEP and provided  patient education to self-manage condition. Encouraged pt to follow up should he need additional sessions in future.  -MO        PT Plan    PT Plan Comments d/c  -MO               User Key  (r) = Recorded By, (t) = Taken By, (c) = Cosigned By      Initials Name Provider Type    Gayle Bradford, PT Physical Therapist                         OP Exercises       Row Name 10/12/23 0900             Subjective    Subjective Comments No pain  -MO         Subjective Pain    Subjective Pain Comment arrives 15 mins late  -MO         Total Minutes    84166 - PT Therapeutic Exercise Minutes 15  -MO      14177 - PT Therapeutic Activity Minutes 15  -MO         Exercise 1    Exercise Name 1 Re-assessment  -MO         Exercise 2    Exercise Name 2 standing HS stretch  -MO      Cueing 2 Verbal  -MO      Reps 2 3  -MO      Time 2 20s  -MO         Exercise 3    Exercise Name 3 Stair gastroc stretch  -MO      Cueing 3 Verbal  -MO      Reps 3 3 BL  -MO      Time 3 20s  -MO         Exercise 4    Exercise Name 4 knee flexion stretch  -MO      Cueing 4 Verbal  -MO      Reps 4 3  -MO      Time 4 20s  -MO         Exercise 5    Exercise Name 5 Piriformis stretch  -MO      Cueing 5 Verbal  -MO      Sets 5 3 BL  -MO      Time 5 20s  -MO         Exercise 6    Exercise Name 6 figure 4 stretch  -MO      Cueing 6 Verbal  -MO      Sets 6 3 BL  -MO      Time 6 20s  -MO         Exercise 7    Exercise Name 7 Supine IT band stretch w/ strap  -MO      Cueing 7 Verbal  -MO      Sets 7 2 bL  -MO      Reps 7 20s  -MO         Exercise 8    Exercise Name 8 SL HR  -MO      Cueing 8 Verbal  -MO                User Key  (r) = Recorded By, (t) = Taken By, (c) = Cosigned By      Initials Name Provider Type    Gayle Bradford, PT Physical Therapist                                    PT OP Goals       Row Name 10/12/23 1000          PT Short Term Goals    STG Date to Achieve 09/09/23  -MO     STG 1 Pt will be independent with initial HEP to improve strength/ROM and  decrease pain.  -MO     STG 1 Progress Met  -MO     STG 2 Pt will report subjective pain of 3/10 while participating in walking program (1-3 miles - 3-5x/week) improve walking tolerance in attempt to return to PLOF.  -MO     STG 2 Progress Met  -MO        Long Term Goals    LTG Date to Achieve 10/09/23  -MO     LTG 1 Pt will be independent with advance HEP to improve strength/ROM and decrease pain.  -MO     LTG 1 Progress Met  -MO     LTG 2 Patient will verbalized understanding regarding post operative expectations regarding healing time frames and functional ability following expected medial meniscus repair surgery.  -MO     LTG 2 Progress Met  -MO     LTG 3 Pt will improve L knee AROM from lacking 4-105 degrees to lacking 2-110 degrees to improve ability to navigate stairs.  -MO     LTG 3 Progress Met  -MO     LTG 4 Pt will improve L SLS to >/= 20 seconds to improve activity tolerance when ambulating over uneven terrain and when hiking.  -MO     LTG 4 Progress Met  -MO     LTG 5 Pt will improve KOS score to 65% to show improved quality of life.  -MO     LTG 5 Progress Ongoing  -MO     LTG 5 Progress Comments 61.4%  -MO               User Key  (r) = Recorded By, (t) = Taken By, (c) = Cosigned By      Initials Name Provider Type    Gayle Bradford, PT Physical Therapist                    Therapy Education  Education Details: Thorough review of previous HEP and discussed several additional options for progressions once program becomes easy. Additionally discussed strength training guidelines and recognizing when it is time to progress an exercise. Emphasized importance of rest days when body feels fatigued.  Given: HEP, Symptoms/condition management, Pain management  Program: New, Reinforced  How Provided: Verbal, Demonstration, Written  Provided to: Patient  Level of Understanding: Verbalized, Demonstrated       Knee Outcome Survey Activities of Daily Living Scale  Symptoms: Pain: I have the symptom, but it does  not affect my activity  Symptoms: Stiffness: I have the symptom, but it does not affect my activity  Symptoms: Swelling: I have the symptom, but it does not affect my activity  Symptoms: Giving way, buckling, or shifting of the knee: I have the symptom, but it does not affect my activity  Symptoms: Weakness: The symptom affects my activity slightly  Symptoms: Limping: The symptom affects my activity slightly  Functional Limitations with ADL's: Walk: Activity is somewhat difficult  Functional Limitations with ADL's: Go up stairs: Activity is minimally difficult  Functional Limitations with ADL's: Go down stairs: Activity is somewhat difficult  Functional Limitations with ADL's: Stand: Activity is minimally difficult  Functional Limitations with ADL's: Kneel on front of your knee: Activity is minimally difficult  Functional Limitations with ADL's: Squat: Activity is minimally difficult      Time Calculation:   Start Time: 0915  Stop Time: 0945  Time Calculation (min): 30 min  Timed Charges  75407 - PT Therapeutic Exercise Minutes: 15  86356 - PT Therapeutic Activity Minutes: 15  Total Minutes  Timed Charges Total Minutes: 30   Total Minutes: 30  Therapy Charges for Today       Code Description Service Date Service Provider Modifiers Qty    [id number]  PT THER PROC EA 15 MIN 10/12/2023 Gayle Ivey, PT GP 1    [id number]  PT THERAPEUTIC ACT EA 15 MIN 10/12/2023 Gayle Ivey PT GP 1                         Gayle Ivey PT  10/12/2023

## 2023-10-17 ENCOUNTER — APPOINTMENT (OUTPATIENT)
Dept: PHYSICAL THERAPY | Facility: HOSPITAL | Age: 53
End: 2023-10-17
Payer: COMMERCIAL